# Patient Record
Sex: MALE | URBAN - METROPOLITAN AREA
[De-identification: names, ages, dates, MRNs, and addresses within clinical notes are randomized per-mention and may not be internally consistent; named-entity substitution may affect disease eponyms.]

---

## 2023-06-09 PROBLEM — M19.072 ARTHRITIS OF FIRST METATARSOPHALANGEAL (MTP) JOINT OF LEFT FOOT: Status: ACTIVE | Noted: 2023-06-09

## 2023-09-28 ENCOUNTER — HOSPITAL ENCOUNTER (OUTPATIENT)
Facility: MEDICAL CENTER | Age: 63
End: 2023-09-28
Payer: MEDICARE

## 2023-09-28 PROBLEM — M48.02 CERVICAL STENOSIS OF SPINE: Status: ACTIVE | Noted: 2023-09-28

## 2024-10-30 ENCOUNTER — PRE-ADMISSION TESTING (OUTPATIENT)
Dept: ADMISSIONS | Facility: MEDICAL CENTER | Age: 64
End: 2024-10-30
Payer: MEDICARE

## 2024-10-30 RX ORDER — LISINOPRIL 10 MG/1
10 TABLET ORAL EVERY MORNING
COMMUNITY

## 2024-10-30 RX ORDER — ACETAMINOPHEN 325 MG/1
325 TABLET ORAL EVERY 6 HOURS PRN
COMMUNITY

## 2024-10-30 NOTE — OR NURSING
Pre admit appointment completed with Pipo and wife Kate for surgery/procedure on 10/31/24.    Medication and fasting instructions given per RN pre procedure protocol. Encouraged patient to increase oral intake the day prior to procedure including intake of electrolyte drinks such as Gatorade or electrolyte water. Patient instructed to not start any vitamins and herbal supplements from now until after surgery. Patient instructed to hold any NSAIDS from now until after surgery, unless otherwise instructed by medical provider.     Patient verbalizes understanding of all instructions given. No further questions at this time. Medication instruction sheet emailed to patient 10/30/24.

## 2024-10-31 ENCOUNTER — HOSPITAL ENCOUNTER (OUTPATIENT)
Facility: MEDICAL CENTER | Age: 64
End: 2024-10-31
Attending: OTOLARYNGOLOGY | Admitting: OTOLARYNGOLOGY
Payer: MEDICARE

## 2024-10-31 ENCOUNTER — ANESTHESIA (OUTPATIENT)
Dept: SURGERY | Facility: MEDICAL CENTER | Age: 64
End: 2024-10-31
Payer: MEDICARE

## 2024-10-31 ENCOUNTER — ANESTHESIA EVENT (OUTPATIENT)
Dept: SURGERY | Facility: MEDICAL CENTER | Age: 64
End: 2024-10-31
Payer: MEDICARE

## 2024-10-31 VITALS
OXYGEN SATURATION: 93 % | HEART RATE: 77 BPM | BODY MASS INDEX: 24.81 KG/M2 | RESPIRATION RATE: 27 BRPM | HEIGHT: 70 IN | SYSTOLIC BLOOD PRESSURE: 111 MMHG | DIASTOLIC BLOOD PRESSURE: 64 MMHG | TEMPERATURE: 97.4 F | WEIGHT: 173.28 LBS

## 2024-10-31 LAB
ANION GAP SERPL CALC-SCNC: 10 MMOL/L (ref 7–16)
BUN SERPL-MCNC: 17 MG/DL (ref 8–22)
CALCIUM SERPL-MCNC: 9.5 MG/DL (ref 8.5–10.5)
CHLORIDE SERPL-SCNC: 105 MMOL/L (ref 96–112)
CO2 SERPL-SCNC: 24 MMOL/L (ref 20–33)
CREAT SERPL-MCNC: 1.1 MG/DL (ref 0.5–1.4)
EKG IMPRESSION: NORMAL
GFR SERPLBLD CREATININE-BSD FMLA CKD-EPI: 74 ML/MIN/1.73 M 2
GLUCOSE SERPL-MCNC: 80 MG/DL (ref 65–99)
POTASSIUM SERPL-SCNC: 4.2 MMOL/L (ref 3.6–5.5)
SODIUM SERPL-SCNC: 139 MMOL/L (ref 135–145)

## 2024-10-31 PROCEDURE — 36415 COLL VENOUS BLD VENIPUNCTURE: CPT

## 2024-10-31 PROCEDURE — 93010 ELECTROCARDIOGRAM REPORT: CPT | Performed by: INTERNAL MEDICINE

## 2024-10-31 PROCEDURE — 700102 HCHG RX REV CODE 250 W/ 637 OVERRIDE(OP): Performed by: ANESTHESIOLOGY

## 2024-10-31 PROCEDURE — 700111 HCHG RX REV CODE 636 W/ 250 OVERRIDE (IP): Performed by: ANESTHESIOLOGY

## 2024-10-31 PROCEDURE — 93005 ELECTROCARDIOGRAM TRACING: CPT | Performed by: OTOLARYNGOLOGY

## 2024-10-31 PROCEDURE — 700101 HCHG RX REV CODE 250: Performed by: ANESTHESIOLOGY

## 2024-10-31 PROCEDURE — 160039 HCHG SURGERY MINUTES - EA ADDL 1 MIN LEVEL 3: Performed by: OTOLARYNGOLOGY

## 2024-10-31 PROCEDURE — 160035 HCHG PACU - 1ST 60 MINS PHASE I: Performed by: OTOLARYNGOLOGY

## 2024-10-31 PROCEDURE — 160046 HCHG PACU - 1ST 60 MINS PHASE II: Performed by: OTOLARYNGOLOGY

## 2024-10-31 PROCEDURE — A9270 NON-COVERED ITEM OR SERVICE: HCPCS | Performed by: ANESTHESIOLOGY

## 2024-10-31 PROCEDURE — 160028 HCHG SURGERY MINUTES - 1ST 30 MINS LEVEL 3: Performed by: OTOLARYNGOLOGY

## 2024-10-31 PROCEDURE — 160009 HCHG ANES TIME/MIN: Performed by: OTOLARYNGOLOGY

## 2024-10-31 PROCEDURE — 80048 BASIC METABOLIC PNL TOTAL CA: CPT

## 2024-10-31 PROCEDURE — 160002 HCHG RECOVERY MINUTES (STAT): Performed by: OTOLARYNGOLOGY

## 2024-10-31 PROCEDURE — 160025 RECOVERY II MINUTES (STATS): Performed by: OTOLARYNGOLOGY

## 2024-10-31 PROCEDURE — 160048 HCHG OR STATISTICAL LEVEL 1-5: Performed by: OTOLARYNGOLOGY

## 2024-10-31 RX ORDER — OXYCODONE HCL 5 MG/5 ML
5 SOLUTION, ORAL ORAL
Status: DISCONTINUED | OUTPATIENT
Start: 2024-10-31 | End: 2024-10-31 | Stop reason: HOSPADM

## 2024-10-31 RX ORDER — OXYCODONE HCL 5 MG/5 ML
10 SOLUTION, ORAL ORAL
Status: DISCONTINUED | OUTPATIENT
Start: 2024-10-31 | End: 2024-10-31 | Stop reason: HOSPADM

## 2024-10-31 RX ORDER — HYDRALAZINE HYDROCHLORIDE 20 MG/ML
5 INJECTION INTRAMUSCULAR; INTRAVENOUS
Status: DISCONTINUED | OUTPATIENT
Start: 2024-10-31 | End: 2024-10-31 | Stop reason: HOSPADM

## 2024-10-31 RX ORDER — ACETAMINOPHEN 500 MG
1000 TABLET ORAL ONCE
Status: COMPLETED | OUTPATIENT
Start: 2024-10-31 | End: 2024-10-31

## 2024-10-31 RX ORDER — LIDOCAINE HYDROCHLORIDE 20 MG/ML
INJECTION, SOLUTION EPIDURAL; INFILTRATION; INTRACAUDAL; PERINEURAL PRN
Status: DISCONTINUED | OUTPATIENT
Start: 2024-10-31 | End: 2024-10-31 | Stop reason: SURG

## 2024-10-31 RX ORDER — CELECOXIB 200 MG/1
200 CAPSULE ORAL ONCE
Status: COMPLETED | OUTPATIENT
Start: 2024-10-31 | End: 2024-10-31

## 2024-10-31 RX ORDER — HALOPERIDOL 5 MG/ML
1 INJECTION INTRAMUSCULAR
Status: DISCONTINUED | OUTPATIENT
Start: 2024-10-31 | End: 2024-10-31 | Stop reason: HOSPADM

## 2024-10-31 RX ORDER — DEXAMETHASONE SODIUM PHOSPHATE 4 MG/ML
INJECTION, SOLUTION INTRA-ARTICULAR; INTRALESIONAL; INTRAMUSCULAR; INTRAVENOUS; SOFT TISSUE PRN
Status: DISCONTINUED | OUTPATIENT
Start: 2024-10-31 | End: 2024-10-31 | Stop reason: SURG

## 2024-10-31 RX ORDER — ONDANSETRON 2 MG/ML
INJECTION INTRAMUSCULAR; INTRAVENOUS PRN
Status: DISCONTINUED | OUTPATIENT
Start: 2024-10-31 | End: 2024-10-31 | Stop reason: SURG

## 2024-10-31 RX ORDER — LABETALOL HYDROCHLORIDE 5 MG/ML
5 INJECTION, SOLUTION INTRAVENOUS
Status: DISCONTINUED | OUTPATIENT
Start: 2024-10-31 | End: 2024-10-31 | Stop reason: HOSPADM

## 2024-10-31 RX ORDER — ONDANSETRON 2 MG/ML
4 INJECTION INTRAMUSCULAR; INTRAVENOUS
Status: DISCONTINUED | OUTPATIENT
Start: 2024-10-31 | End: 2024-10-31 | Stop reason: HOSPADM

## 2024-10-31 RX ORDER — ROCURONIUM BROMIDE 10 MG/ML
INJECTION, SOLUTION INTRAVENOUS PRN
Status: DISCONTINUED | OUTPATIENT
Start: 2024-10-31 | End: 2024-10-31 | Stop reason: SURG

## 2024-10-31 RX ORDER — PHENYLEPHRINE HCL IN 0.9% NACL 1 MG/10 ML
SYRINGE (ML) INTRAVENOUS PRN
Status: DISCONTINUED | OUTPATIENT
Start: 2024-10-31 | End: 2024-10-31 | Stop reason: SURG

## 2024-10-31 RX ADMIN — CELECOXIB 200 MG: 200 CAPSULE ORAL at 13:04

## 2024-10-31 RX ADMIN — ACETAMINOPHEN 1000 MG: 500 TABLET ORAL at 13:04

## 2024-10-31 RX ADMIN — SUGAMMADEX 200 MG: 100 INJECTION, SOLUTION INTRAVENOUS at 14:17

## 2024-10-31 RX ADMIN — Medication 200 MCG: at 14:03

## 2024-10-31 RX ADMIN — ROCURONIUM BROMIDE 30 MG: 50 INJECTION, SOLUTION INTRAVENOUS at 13:51

## 2024-10-31 RX ADMIN — PROPOFOL 150 MG: 10 INJECTION, EMULSION INTRAVENOUS at 13:51

## 2024-10-31 RX ADMIN — DEXAMETHASONE SODIUM PHOSPHATE 8 MG: 4 INJECTION INTRA-ARTICULAR; INTRALESIONAL; INTRAMUSCULAR; INTRAVENOUS; SOFT TISSUE at 13:56

## 2024-10-31 RX ADMIN — ONDANSETRON 4 MG: 2 INJECTION INTRAMUSCULAR; INTRAVENOUS at 14:16

## 2024-10-31 RX ADMIN — FENTANYL CITRATE 100 MCG: 50 INJECTION, SOLUTION INTRAMUSCULAR; INTRAVENOUS at 13:51

## 2024-10-31 RX ADMIN — LIDOCAINE HYDROCHLORIDE 60 MG: 20 INJECTION, SOLUTION EPIDURAL; INFILTRATION; INTRACAUDAL at 13:51

## 2024-10-31 ASSESSMENT — PAIN DESCRIPTION - PAIN TYPE
TYPE: SURGICAL PAIN

## 2024-10-31 ASSESSMENT — PAIN SCALES - GENERAL: PAIN_LEVEL: 0

## 2024-10-31 NOTE — OR NURSING
1425: Pt arrived from OR to PACU 7  Connected to monitor. Report received from anesthesia & RN. VSS. Oxygen at 4L via mask. Breaths calm, even, and unlabored.  No signs of pain.     1430 02 weaned to room air, dentures in place.     1435 Spouse Kate brought to bedside and updated to status and plan of care.     1445 Discharge instructions reviewed with patient and family member. All questions answered, verbalizes understanding.     1500: Pt assisted into clothing by spouse    1506: IV and ID bands removed. Pt then escorted to car via wheelchair, accompanied by CCT Demetrice. All personal belongings & discharge instructions with patient/family.

## 2024-10-31 NOTE — DISCHARGE INSTRUCTIONS
If any questions arise, call your provider.  If your provider is not available, please feel free to call the Surgical Center at [phone].    MEDICATIONS: Resume taking daily medication.  Take prescribed pain medication with food.  If no medication is prescribed, you may take non-aspirin pain medication if needed.  PAIN MEDICATION CAN BE VERY CONSTIPATING.  Take a stool softener or laxative such as senokot, pericolace, or milk of magnesia if needed.    Last pain medication given at     1:04 pm: Tylenol and Celebrex (anti-inflammatory like Ibuprofen).     Ok to take more Tylenol and/or Ibuprofen at 7:04 pm if needed.

## 2024-10-31 NOTE — OP REPORT
DATE OF SERVICE: 10/31/24    SURGEON: Bryan Ames MD    ANESTHESIOLOGIST:  Amauri Beckwith MD     PREOPERATIVE DIAGNOSIS:   Left laryngeal neoplasm     POSTOPERATIVE DIAGNOSIS: Left laryngeal neoplasm     PROCEDURE PERFORMED:  Direct laryngoscopy and esophagoscopy     INDICATIONS FOR PROCEDURE: Mr. Gould is a 64-year-old male who had a CT at an outside facility showing a mass in the left piriform sinus/larynx.  Those images were not available for review.  Risks, benefits and   alternatives to the aforementioned procedure were discussed with the patient   preoperatively.     OPERATIVE FINDINGS: No tumor identified in the larynx or upper esophagus     PROCEDURE IN DETAIL:  The patient was identified in the preoperative holding   area and brought to the operating room.  The patient was intubated with a glide scope.  They were turned 90 degrees and a timeout was satisfactorily completed..  A Dedo laryngoscope was inserted into the hypopharynx and   photodocumentation was taken.  No visible tumors were present in the vallecula, larynx, piriform sinus or proximal esophagus. The   patient was turned back to the anesthesiologist before being awoken and   brought to the postanesthesia care unit in good condition.     ESTIMATED BLOOD LOSS:  1 mL     SPECIMENS REMOVED: None     COMPLICATIONS:  None.     IMPLANTS:  None.

## 2024-11-05 ENCOUNTER — HOSPITAL ENCOUNTER (OUTPATIENT)
Dept: RADIOLOGY | Facility: MEDICAL CENTER | Age: 64
End: 2024-11-05
Payer: MEDICARE

## 2024-11-08 ENCOUNTER — HOSPITAL ENCOUNTER (OUTPATIENT)
Dept: RADIOLOGY | Facility: MEDICAL CENTER | Age: 64
End: 2024-11-08
Payer: MEDICARE

## 2024-11-22 ENCOUNTER — HOSPITAL ENCOUNTER (OUTPATIENT)
Dept: RADIOLOGY | Facility: MEDICAL CENTER | Age: 64
End: 2024-11-22
Attending: OTOLARYNGOLOGY
Payer: MEDICARE

## 2024-11-22 DIAGNOSIS — D38.0 NEOPLASM OF UNCERTAIN BEHAVIOR OF LARYNX: ICD-10-CM

## 2024-11-22 PROCEDURE — 10005 FNA BX W/US GDN 1ST LES: CPT

## 2024-11-22 PROCEDURE — 88173 CYTOPATH EVAL FNA REPORT: CPT

## 2024-11-22 PROCEDURE — 88305 TISSUE EXAM BY PATHOLOGIST: CPT

## 2024-11-25 LAB — CYTOLOGY REG CYTOL: NORMAL

## 2024-12-10 ENCOUNTER — HOSPITAL ENCOUNTER (OUTPATIENT)
Dept: RADIOLOGY | Facility: MEDICAL CENTER | Age: 64
End: 2024-12-10
Attending: OTOLARYNGOLOGY
Payer: MEDICARE

## 2024-12-10 DIAGNOSIS — D38.0 NEOPLASM OF UNCERTAIN BEHAVIOR OF LARYNX: ICD-10-CM

## 2024-12-10 LAB
PATHOLOGY CONSULT NOTE: NORMAL
PATHOLOGY CONSULT NOTE: NORMAL

## 2024-12-10 PROCEDURE — 76942 ECHO GUIDE FOR BIOPSY: CPT

## 2024-12-10 PROCEDURE — 88368 INSITU HYBRIDIZATION MANUAL: CPT

## 2024-12-10 PROCEDURE — 88369 M/PHMTRC ALYSISHQUANT/SEMIQ: CPT

## 2024-12-10 PROCEDURE — 88365 INSITU HYBRIDIZATION (FISH): CPT

## 2024-12-10 PROCEDURE — 88341 IMHCHEM/IMCYTCHM EA ADD ANTB: CPT | Mod: 91

## 2024-12-10 PROCEDURE — 88305 TISSUE EXAM BY PATHOLOGIST: CPT

## 2024-12-10 PROCEDURE — 20206 BIOPSY MUSCLE PERQ NEEDLE: CPT

## 2024-12-10 PROCEDURE — 88342 IMHCHEM/IMCYTCHM 1ST ANTB: CPT

## 2024-12-10 NOTE — PROGRESS NOTES
US guided left neck mass core biopsy done by Dr. Yin; NON-SEDATION (no H&P required as this is a NON SEDATION procedure) left anterior aspect of neck access site, dressing CDI; 3 cores in formalin obtained and sent to lab. Pt tolerated the procedure well. Pt hemodynamically stable pre/intra/post procedure; all questions and concerns answered prior to being d/c; patient provided with appropriate education for procedure; pt d/c home.

## 2024-12-18 ENCOUNTER — PRE-ADMISSION TESTING (OUTPATIENT)
Dept: ADMISSIONS | Facility: MEDICAL CENTER | Age: 64
End: 2024-12-18
Payer: MEDICARE

## 2024-12-18 RX ORDER — ACETAMINOPHEN 500 MG
500-1000 TABLET ORAL PRN
COMMUNITY
End: 2024-12-18

## 2024-12-18 NOTE — PREADMIT AVS NOTE
Current Medications   Medication Instructions    IBUPROFEN PO HOLD 5 DAYS PRIOR TO SURGERY, UNLESS INSTRUCTED BY SURGEON.    lisinopril (PRINIVIL) 10 MG Tab HOLD 24 HOURS PRIOR TO SURGERY.     acetaminophen (TYLENOL) 325 MG Tab As needed medication, may take if needed, including morning of procedure

## 2024-12-19 ENCOUNTER — HOSPITAL ENCOUNTER (OUTPATIENT)
Facility: MEDICAL CENTER | Age: 64
End: 2024-12-19
Attending: OTOLARYNGOLOGY | Admitting: OTOLARYNGOLOGY
Payer: MEDICARE

## 2024-12-19 ENCOUNTER — ANESTHESIA (OUTPATIENT)
Dept: SURGERY | Facility: MEDICAL CENTER | Age: 64
End: 2024-12-19
Payer: MEDICARE

## 2024-12-19 ENCOUNTER — ANESTHESIA EVENT (OUTPATIENT)
Dept: SURGERY | Facility: MEDICAL CENTER | Age: 64
End: 2024-12-19
Payer: MEDICARE

## 2024-12-19 VITALS
WEIGHT: 172.18 LBS | SYSTOLIC BLOOD PRESSURE: 109 MMHG | RESPIRATION RATE: 29 BRPM | HEART RATE: 90 BPM | OXYGEN SATURATION: 92 % | HEIGHT: 70 IN | DIASTOLIC BLOOD PRESSURE: 67 MMHG | TEMPERATURE: 97.1 F | BODY MASS INDEX: 24.65 KG/M2

## 2024-12-19 LAB — PATHOLOGY CONSULT NOTE: NORMAL

## 2024-12-19 PROCEDURE — 160041 HCHG SURGERY MINUTES - EA ADDL 1 MIN LEVEL 4: Performed by: OTOLARYNGOLOGY

## 2024-12-19 PROCEDURE — 700102 HCHG RX REV CODE 250 W/ 637 OVERRIDE(OP): Performed by: ANESTHESIOLOGY

## 2024-12-19 PROCEDURE — 160002 HCHG RECOVERY MINUTES (STAT): Performed by: OTOLARYNGOLOGY

## 2024-12-19 PROCEDURE — 700111 HCHG RX REV CODE 636 W/ 250 OVERRIDE (IP): Mod: JZ | Performed by: ANESTHESIOLOGY

## 2024-12-19 PROCEDURE — 160009 HCHG ANES TIME/MIN: Performed by: OTOLARYNGOLOGY

## 2024-12-19 PROCEDURE — 160025 RECOVERY II MINUTES (STATS): Performed by: OTOLARYNGOLOGY

## 2024-12-19 PROCEDURE — 700111 HCHG RX REV CODE 636 W/ 250 OVERRIDE (IP): Performed by: ANESTHESIOLOGY

## 2024-12-19 PROCEDURE — 160035 HCHG PACU - 1ST 60 MINS PHASE I: Performed by: OTOLARYNGOLOGY

## 2024-12-19 PROCEDURE — 700101 HCHG RX REV CODE 250: Performed by: ANESTHESIOLOGY

## 2024-12-19 PROCEDURE — A9270 NON-COVERED ITEM OR SERVICE: HCPCS | Performed by: ANESTHESIOLOGY

## 2024-12-19 PROCEDURE — 700102 HCHG RX REV CODE 250 W/ 637 OVERRIDE(OP): Performed by: OTOLARYNGOLOGY

## 2024-12-19 PROCEDURE — 700101 HCHG RX REV CODE 250: Performed by: OTOLARYNGOLOGY

## 2024-12-19 PROCEDURE — 160048 HCHG OR STATISTICAL LEVEL 1-5: Performed by: OTOLARYNGOLOGY

## 2024-12-19 PROCEDURE — 160029 HCHG SURGERY MINUTES - 1ST 30 MINS LEVEL 4: Performed by: OTOLARYNGOLOGY

## 2024-12-19 PROCEDURE — 160046 HCHG PACU - 1ST 60 MINS PHASE II: Performed by: OTOLARYNGOLOGY

## 2024-12-19 PROCEDURE — 700105 HCHG RX REV CODE 258: Performed by: ANESTHESIOLOGY

## 2024-12-19 PROCEDURE — A9270 NON-COVERED ITEM OR SERVICE: HCPCS | Performed by: OTOLARYNGOLOGY

## 2024-12-19 RX ORDER — SODIUM CHLORIDE 9 MG/ML
INJECTION, SOLUTION INTRAVENOUS
Status: DISCONTINUED | OUTPATIENT
Start: 2024-12-19 | End: 2024-12-19 | Stop reason: SURG

## 2024-12-19 RX ORDER — ACETAMINOPHEN 500 MG
1000 TABLET ORAL EVERY 6 HOURS PRN
Status: DISCONTINUED | OUTPATIENT
Start: 2024-12-19 | End: 2024-12-19 | Stop reason: HOSPADM

## 2024-12-19 RX ORDER — HYDROMORPHONE HYDROCHLORIDE 1 MG/ML
0.4 INJECTION, SOLUTION INTRAMUSCULAR; INTRAVENOUS; SUBCUTANEOUS
Status: DISCONTINUED | OUTPATIENT
Start: 2024-12-19 | End: 2024-12-19 | Stop reason: HOSPADM

## 2024-12-19 RX ORDER — BACITRACIN ZINC 500 [USP'U]/G
OINTMENT TOPICAL
Status: DISCONTINUED | OUTPATIENT
Start: 2024-12-19 | End: 2024-12-19 | Stop reason: HOSPADM

## 2024-12-19 RX ORDER — DEXAMETHASONE SODIUM PHOSPHATE 4 MG/ML
INJECTION, SOLUTION INTRA-ARTICULAR; INTRALESIONAL; INTRAMUSCULAR; INTRAVENOUS; SOFT TISSUE PRN
Status: DISCONTINUED | OUTPATIENT
Start: 2024-12-19 | End: 2024-12-19 | Stop reason: SURG

## 2024-12-19 RX ORDER — ONDANSETRON 2 MG/ML
INJECTION INTRAMUSCULAR; INTRAVENOUS PRN
Status: DISCONTINUED | OUTPATIENT
Start: 2024-12-19 | End: 2024-12-19 | Stop reason: SURG

## 2024-12-19 RX ORDER — BUPIVACAINE HYDROCHLORIDE AND EPINEPHRINE 5; 5 MG/ML; UG/ML
INJECTION, SOLUTION PERINEURAL
Status: DISCONTINUED | OUTPATIENT
Start: 2024-12-19 | End: 2024-12-19 | Stop reason: HOSPADM

## 2024-12-19 RX ORDER — ONDANSETRON 2 MG/ML
4 INJECTION INTRAMUSCULAR; INTRAVENOUS
Status: DISCONTINUED | OUTPATIENT
Start: 2024-12-19 | End: 2024-12-19 | Stop reason: HOSPADM

## 2024-12-19 RX ORDER — PHENYLEPHRINE HCL IN 0.9% NACL 1 MG/10 ML
SYRINGE (ML) INTRAVENOUS PRN
Status: DISCONTINUED | OUTPATIENT
Start: 2024-12-19 | End: 2024-12-19 | Stop reason: SURG

## 2024-12-19 RX ORDER — DIPHENHYDRAMINE HYDROCHLORIDE 50 MG/ML
6.25 INJECTION INTRAMUSCULAR; INTRAVENOUS
Status: DISCONTINUED | OUTPATIENT
Start: 2024-12-19 | End: 2024-12-19 | Stop reason: HOSPADM

## 2024-12-19 RX ORDER — EPHEDRINE SULFATE 50 MG/ML
10 INJECTION, SOLUTION INTRAVENOUS
Status: DISCONTINUED | OUTPATIENT
Start: 2024-12-19 | End: 2024-12-19 | Stop reason: HOSPADM

## 2024-12-19 RX ORDER — LIDOCAINE HYDROCHLORIDE 20 MG/ML
INJECTION, SOLUTION EPIDURAL; INFILTRATION; INTRACAUDAL; PERINEURAL PRN
Status: DISCONTINUED | OUTPATIENT
Start: 2024-12-19 | End: 2024-12-19 | Stop reason: SURG

## 2024-12-19 RX ORDER — MEPERIDINE HYDROCHLORIDE 25 MG/ML
12.5 INJECTION INTRAMUSCULAR; INTRAVENOUS; SUBCUTANEOUS
Status: DISCONTINUED | OUTPATIENT
Start: 2024-12-19 | End: 2024-12-19 | Stop reason: HOSPADM

## 2024-12-19 RX ORDER — HYDROMORPHONE HYDROCHLORIDE 1 MG/ML
0.2 INJECTION, SOLUTION INTRAMUSCULAR; INTRAVENOUS; SUBCUTANEOUS
Status: DISCONTINUED | OUTPATIENT
Start: 2024-12-19 | End: 2024-12-19 | Stop reason: HOSPADM

## 2024-12-19 RX ORDER — OXYCODONE HCL 5 MG/5 ML
5 SOLUTION, ORAL ORAL
Status: COMPLETED | OUTPATIENT
Start: 2024-12-19 | End: 2024-12-19

## 2024-12-19 RX ORDER — BACITRACIN ZINC 500 [USP'U]/G
OINTMENT TOPICAL
Status: DISCONTINUED
Start: 2024-12-19 | End: 2024-12-19 | Stop reason: HOSPADM

## 2024-12-19 RX ORDER — HYDRALAZINE HYDROCHLORIDE 20 MG/ML
5 INJECTION INTRAMUSCULAR; INTRAVENOUS
Status: DISCONTINUED | OUTPATIENT
Start: 2024-12-19 | End: 2024-12-19 | Stop reason: HOSPADM

## 2024-12-19 RX ORDER — LIDOCAINE HYDROCHLORIDE AND EPINEPHRINE 10; 10 MG/ML; UG/ML
INJECTION, SOLUTION INFILTRATION; PERINEURAL
Status: DISCONTINUED
Start: 2024-12-19 | End: 2024-12-19 | Stop reason: HOSPADM

## 2024-12-19 RX ORDER — EPHEDRINE SULFATE 50 MG/ML
INJECTION, SOLUTION INTRAVENOUS PRN
Status: DISCONTINUED | OUTPATIENT
Start: 2024-12-19 | End: 2024-12-19 | Stop reason: SURG

## 2024-12-19 RX ORDER — CEFAZOLIN SODIUM 1 G/3ML
INJECTION, POWDER, FOR SOLUTION INTRAMUSCULAR; INTRAVENOUS PRN
Status: DISCONTINUED | OUTPATIENT
Start: 2024-12-19 | End: 2024-12-19 | Stop reason: SURG

## 2024-12-19 RX ORDER — ALBUTEROL SULFATE 5 MG/ML
2.5 SOLUTION RESPIRATORY (INHALATION)
Status: DISCONTINUED | OUTPATIENT
Start: 2024-12-19 | End: 2024-12-19 | Stop reason: HOSPADM

## 2024-12-19 RX ORDER — HALOPERIDOL 5 MG/ML
1 INJECTION INTRAMUSCULAR
Status: DISCONTINUED | OUTPATIENT
Start: 2024-12-19 | End: 2024-12-19 | Stop reason: HOSPADM

## 2024-12-19 RX ORDER — SODIUM CHLORIDE, SODIUM LACTATE, POTASSIUM CHLORIDE, CALCIUM CHLORIDE 600; 310; 30; 20 MG/100ML; MG/100ML; MG/100ML; MG/100ML
INJECTION, SOLUTION INTRAVENOUS CONTINUOUS
Status: DISCONTINUED | OUTPATIENT
Start: 2024-12-19 | End: 2024-12-19 | Stop reason: HOSPADM

## 2024-12-19 RX ORDER — OXYCODONE HCL 5 MG/5 ML
10 SOLUTION, ORAL ORAL
Status: COMPLETED | OUTPATIENT
Start: 2024-12-19 | End: 2024-12-19

## 2024-12-19 RX ORDER — HYDROMORPHONE HYDROCHLORIDE 1 MG/ML
0.1 INJECTION, SOLUTION INTRAMUSCULAR; INTRAVENOUS; SUBCUTANEOUS
Status: DISCONTINUED | OUTPATIENT
Start: 2024-12-19 | End: 2024-12-19 | Stop reason: HOSPADM

## 2024-12-19 RX ADMIN — ONDANSETRON 4 MG: 2 INJECTION INTRAMUSCULAR; INTRAVENOUS at 13:39

## 2024-12-19 RX ADMIN — FENTANYL CITRATE 50 MCG: 50 INJECTION, SOLUTION INTRAMUSCULAR; INTRAVENOUS at 13:19

## 2024-12-19 RX ADMIN — Medication 100 MCG: at 13:31

## 2024-12-19 RX ADMIN — PROPOFOL 140 MG: 10 INJECTION, EMULSION INTRAVENOUS at 13:19

## 2024-12-19 RX ADMIN — DEXAMETHASONE SODIUM PHOSPHATE 8 MG: 4 INJECTION INTRA-ARTICULAR; INTRALESIONAL; INTRAMUSCULAR; INTRAVENOUS; SOFT TISSUE at 13:22

## 2024-12-19 RX ADMIN — EPHEDRINE SULFATE 10 MG: 50 INJECTION, SOLUTION INTRAVENOUS at 13:29

## 2024-12-19 RX ADMIN — FENTANYL CITRATE 50 MCG: 50 INJECTION, SOLUTION INTRAMUSCULAR; INTRAVENOUS at 14:06

## 2024-12-19 RX ADMIN — CEFAZOLIN 2 G: 1 INJECTION, POWDER, FOR SOLUTION INTRAMUSCULAR; INTRAVENOUS at 13:19

## 2024-12-19 RX ADMIN — Medication 100 MCG: at 13:45

## 2024-12-19 RX ADMIN — EPHEDRINE SULFATE 10 MG: 50 INJECTION, SOLUTION INTRAVENOUS at 13:27

## 2024-12-19 RX ADMIN — OXYCODONE HYDROCHLORIDE 10 MG: 5 SOLUTION ORAL at 14:06

## 2024-12-19 RX ADMIN — LIDOCAINE HYDROCHLORIDE 50 MG: 20 INJECTION, SOLUTION EPIDURAL; INFILTRATION; INTRACAUDAL; PERINEURAL at 13:19

## 2024-12-19 RX ADMIN — SODIUM CHLORIDE: 9 INJECTION, SOLUTION INTRAVENOUS at 13:16

## 2024-12-19 ASSESSMENT — PAIN DESCRIPTION - PAIN TYPE
TYPE: SURGICAL PAIN

## 2024-12-19 NOTE — DISCHARGE INSTRUCTIONS
What to Expect Post Anesthesia    Rest and take it easy for the first 24 hours.  A responsible adult is recommended to remain with you during that time.  It is normal to feel sleepy.  We encourage you to not do anything that requires balance, judgment or coordination.    FOR 24 HOURS DO NOT:  Drive, operate machinery or run household appliances.  Drink beer or alcoholic beverages.  Make important decisions or sign legal documents.    To avoid nausea, slowly advance diet as tolerated, avoiding spicy or greasy foods for the first day.  Add more substantial food to your diet according to your provider's instructions.  Babies can be fed formula or breast milk as soon as they are hungry.  INCREASE FLUIDS AND FIBER TO AVOID CONSTIPATION.    MILD FLU-LIKE SYMPTOMS ARE NORMAL.  YOU MAY EXPERIENCE GENERALIZED MUSCLE ACHES, THROAT IRRITATION, HEADACHE AND/OR SOME NAUSEA.    If any questions arise, call your provider.  If your provider is not available, please feel free to call the Surgical Center at [phone].    MEDICATIONS: Resume taking daily medication.  Take prescribed pain medication with food.  If no medication is prescribed, you may take non-aspirin pain medication if needed.  PAIN MEDICATION CAN BE VERY CONSTIPATING.  Take a stool softener or laxative such as senokot, pericolace, or milk of magnesia if needed.    Oxycodone given at 2:06 PM.   You may take tylenol and ibuprofen whenever you may need.

## 2024-12-19 NOTE — OR NURSING
1353 Patient arrived to PACU from OR. Report received from RN and anesthesia. Patient attached to monitoring. VSS. Patient oxygenating well on 6 L via mask. Site assessed on L neck, CDI. No signs of bleeding.     1406 Patient feeling pain in neck. IV and PO medication given per MAR.    1415 Patient wife brought to bedside     1430 Patient meets phase two criteria. Tolerating PO liquids without complication.     1440 RN went over discharge instructions with patient and patient's wife. All questions answered.     1445 Kwaku NORRIS at bedside to discuss procedure with patient.     1453 Intact IV removed by RN. Patient meets discharge criteria. VSS. Pt discharged to a responsible adult via wheelchair by RN. Pt in possession of all personal belongings.

## 2024-12-19 NOTE — OP REPORT
DATE OF SERVICE: 12/19/24     SURGEON:  Bryan Ames MD.     ASSISTANT:  None     ANESTHESIOLOGIST:  Amauri Rivero MD     PREOPERATIVE DIAGNOSIS:   1.  Left neck mass  2.  Presumed lymphoma     POSTOPERATIVE DIAGNOSIS:    1.  Left neck mass  2.  Presumed lymphoma     PROCEDURE: Incisional biopsy of deep left neck mass <5cm     INDICATIONS FOR PROCEDURE: 64-year-old male with thyroid cartilage-destroying left neck mass.  He has had a core biopsy that was nondiagnostic so is here for an incisional biopsy..  Risks, benefits, and alternativesto the procedure were discussed with the patient preoperatively and the patient gave their informed written consent.     OPERATIVE FINDINGS: Approximately 1 cm³ of tissue taken for flow cytometry     DESCRIPTION OF PROCEDURE:  The patient was identified in the preoperative holding area and brought to the operating room.  A laryngeal mask airway was placed and a timeout was satisfactorily completed.  A neck crease was marked and injected with 1% lidocaine with 1-100,000 epinephrine.  3 cc of this were used.  The patient was prepped and draped in usual sterile fashion.  A skin incision was made with a 15 blade scalpel and this was carried down through the platysma.  The strap muscles were  bluntly.  The mass was encountered and incised with the Bovie taking out several pieces.  The wound cavity was hemostatic so the platysma was reapproximated using simple interrupted 3-0 Vicryl sutures.  4-0 Vicryl sutures were placed in the deep dermis.  Bacitracin, Telfa and Tegaderm dressing was applied.  The patient was awoken and brought to the postanesthesia care unit in good condition.     ESTIMATED BLOOD LOSS:  <5 mL.     URINE OUTPUT:  Not recorded.     COMPLICATIONS:  None.     SPECIMENS REMOVED: Left neck mass, sent in saline, lymphoma protocol

## 2024-12-19 NOTE — ANESTHESIA PROCEDURE NOTES
Airway    Date/Time: 12/19/2024 1:19 PM    Performed by: Amauri Rivero M.D.  Authorized by: Amauri Rivero M.D.    Location:  OR  Urgency:  Elective  Difficult Airway: No    Indications for Airway Management:  Anesthesia      Spontaneous Ventilation: absent    Sedation Level:  Deep  Preoxygenated: Yes    Mask Difficulty Assessment:  0 - not attempted  Final Airway Type:  Supraglottic airway  Final Supraglottic Airway:  Standard LMA    SGA Size:  4  Number of Attempts at Approach:  1

## 2024-12-19 NOTE — ANESTHESIA POSTPROCEDURE EVALUATION
Patient: Pipo Gould    Procedure Summary       Date: 12/19/24 Room / Location: [facility] ROOM 23 / SURGERY SAME DAY [facility]    Anesthesia Start: 1316 Anesthesia Stop: 1355    Procedure: BIOPSY OR EXCISION OF LYMPH NODES; OPEN DEEP CERVICAL NODES, LEFT (Left: Neck) Diagnosis: (NEOPLASM OF UNCERTAIN BEHAVIOR)    Surgeons: Bryan Ames M.D. Responsible Provider: Amauri Rivero M.D.    Anesthesia Type: general ASA Status: 2            Final Anesthesia Type: general  Last vitals  BP   Blood Pressure : 102/66    Temp   36.2 °C (97.1 °F)    Pulse   87   Resp   19    SpO2   93 %      Anesthesia Post Evaluation    Patient location during evaluation: PACU  Patient participation: complete - patient participated  Level of consciousness: awake and alert    Airway patency: patent  Anesthetic complications: no  Cardiovascular status: hemodynamically stable  Respiratory status: acceptable  Hydration status: euvolemic    PONV: none          No notable events documented.     Nurse Pain Score: 6 (NPRS)

## 2024-12-19 NOTE — ANESTHESIA PREPROCEDURE EVALUATION
Case: [id number] Date/Time: 12/19/24 1245    Procedure: BIOPSY OR EXCISION OF LYMPH NODES; OPEN DEEP CERVICAL NODES    Pre-op diagnosis: NEOPLASM OF UNCERTAIN BEHAVIOR    Location: [facility] OR 10 / SURGERY [facility]    Surgeons: Bryan Ames M.D.          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, need more tissue for dx/p laryngoscopy (10/31/24) w/o visible tumor    Relevant Problems   Other   (positive) Arthritis of first metatarsophalangeal (MTP) joint of left foot       Physical Exam    Airway   Mallampati: II  TM distance: >3 FB  Neck ROM: full       Cardiovascular - normal exam  Rhythm: regular  Rate: normal  (-) murmur     Dental - normal exam           Pulmonary - normal exam  Breath sounds clear to auscultation     Abdominal    Neurological - normal exam                   Anesthesia Plan    ASA 2       Plan - general       Airway plan will be LMA          Induction: intravenous    Postoperative Plan: Postoperative administration of opioids is intended.    Pertinent diagnostic labs and testing reviewed    Informed Consent:    Anesthetic plan and risks discussed with patient.

## 2024-12-19 NOTE — ANESTHESIA TIME REPORT
Anesthesia Start and Stop Event Times       Date Time Event    12/19/2024 1313 Ready for Procedure     1316 Anesthesia Start     1355 Anesthesia Stop          Responsible Staff  12/19/24      Name Role Begin End    Amauri Rivero M.D. Anesth 1316 1356          Overtime Reason:  no overtime (within assigned shift)    Comments:

## 2025-01-11 NOTE — PROGRESS NOTES
US guided left neck mass fine needle aspiration done by Dr. Yin; NON-SEDATION (no H&P required as this is a NON SEDATION procedure) left anterior aspect of neck access site, dressing CDI; 4 samples in 1 jar of cytolyt obtained and sent to lab. Pt tolerated the procedure well. Pt hemodynamically stable pre/intra/post procedure; all questions and concerns answered prior to being d/c; patient provided with appropriate education for procedure; pt d/c home.  
5 Woo Foreman RN

## 2025-01-23 ENCOUNTER — HOSPITAL ENCOUNTER (OUTPATIENT)
Dept: RADIATION ONCOLOGY | Facility: MEDICAL CENTER | Age: 65
End: 2025-01-23
Attending: RADIOLOGY
Payer: MEDICARE

## 2025-01-23 ENCOUNTER — APPOINTMENT (OUTPATIENT)
Dept: RADIOLOGY | Facility: MEDICAL CENTER | Age: 65
DRG: 842 | End: 2025-01-23
Payer: MEDICARE

## 2025-01-23 ENCOUNTER — HOSPITAL ENCOUNTER (INPATIENT)
Facility: MEDICAL CENTER | Age: 65
LOS: 2 days | DRG: 842 | End: 2025-01-25
Attending: EMERGENCY MEDICINE | Admitting: HOSPITALIST
Payer: MEDICARE

## 2025-01-23 ENCOUNTER — APPOINTMENT (OUTPATIENT)
Dept: RADIOLOGY | Facility: MEDICAL CENTER | Age: 65
DRG: 842 | End: 2025-01-23
Attending: EMERGENCY MEDICINE
Payer: MEDICARE

## 2025-01-23 DIAGNOSIS — R22.1 NECK MASS: ICD-10-CM

## 2025-01-23 DIAGNOSIS — K59.00 CONSTIPATION, UNSPECIFIED CONSTIPATION TYPE: ICD-10-CM

## 2025-01-23 PROBLEM — C90.30: Status: ACTIVE | Noted: 2025-01-23

## 2025-01-23 LAB
ANION GAP SERPL CALC-SCNC: 13 MMOL/L (ref 7–16)
BASOPHILS # BLD AUTO: 0.2 % (ref 0–1.8)
BASOPHILS # BLD: 0.03 K/UL (ref 0–0.12)
BUN SERPL-MCNC: 16 MG/DL (ref 8–22)
CA-I SERPL-SCNC: 1.2 MMOL/L (ref 1.1–1.3)
CALCIUM SERPL-MCNC: 9.3 MG/DL (ref 8.5–10.5)
CHLORIDE SERPL-SCNC: 104 MMOL/L (ref 96–112)
CO2 SERPL-SCNC: 22 MMOL/L (ref 20–33)
CREAT SERPL-MCNC: 1.24 MG/DL (ref 0.5–1.4)
EKG IMPRESSION: NORMAL
EOSINOPHIL # BLD AUTO: 0.01 K/UL (ref 0–0.51)
EOSINOPHIL NFR BLD: 0.1 % (ref 0–6.9)
ERYTHROCYTE [DISTWIDTH] IN BLOOD BY AUTOMATED COUNT: 45.6 FL (ref 35.9–50)
GFR SERPLBLD CREATININE-BSD FMLA CKD-EPI: 64 ML/MIN/1.73 M 2
GLUCOSE SERPL-MCNC: 92 MG/DL (ref 65–99)
HCT VFR BLD AUTO: 42.5 % (ref 42–52)
HGB BLD-MCNC: 13.8 G/DL (ref 14–18)
IMM GRANULOCYTES # BLD AUTO: 0.06 K/UL (ref 0–0.11)
IMM GRANULOCYTES NFR BLD AUTO: 0.5 % (ref 0–0.9)
LYMPHOCYTES # BLD AUTO: 1.02 K/UL (ref 1–4.8)
LYMPHOCYTES NFR BLD: 8 % (ref 22–41)
MAGNESIUM SERPL-MCNC: 2 MG/DL (ref 1.5–2.5)
MCH RBC QN AUTO: 32.2 PG (ref 27–33)
MCHC RBC AUTO-ENTMCNC: 32.5 G/DL (ref 32.3–36.5)
MCV RBC AUTO: 99.1 FL (ref 81.4–97.8)
MONOCYTES # BLD AUTO: 1.77 K/UL (ref 0–0.85)
MONOCYTES NFR BLD AUTO: 13.9 % (ref 0–13.4)
NEUTROPHILS # BLD AUTO: 9.83 K/UL (ref 1.82–7.42)
NEUTROPHILS NFR BLD: 77.3 % (ref 44–72)
NRBC # BLD AUTO: 0 K/UL
NRBC BLD-RTO: 0 /100 WBC (ref 0–0.2)
PHOSPHATE SERPL-MCNC: 2.2 MG/DL (ref 2.5–4.5)
PLATELET # BLD AUTO: 167 K/UL (ref 164–446)
PMV BLD AUTO: 10.1 FL (ref 9–12.9)
POTASSIUM SERPL-SCNC: 4 MMOL/L (ref 3.6–5.5)
RBC # BLD AUTO: 4.29 M/UL (ref 4.7–6.1)
SODIUM SERPL-SCNC: 139 MMOL/L (ref 135–145)
WBC # BLD AUTO: 12.7 K/UL (ref 4.8–10.8)

## 2025-01-23 PROCEDURE — 70491 CT SOFT TISSUE NECK W/DYE: CPT

## 2025-01-23 PROCEDURE — 700111 HCHG RX REV CODE 636 W/ 250 OVERRIDE (IP): Performed by: HOSPITALIST

## 2025-01-23 PROCEDURE — 36415 COLL VENOUS BLD VENIPUNCTURE: CPT

## 2025-01-23 PROCEDURE — 83735 ASSAY OF MAGNESIUM: CPT

## 2025-01-23 PROCEDURE — 85025 COMPLETE CBC W/AUTO DIFF WBC: CPT

## 2025-01-23 PROCEDURE — 770004 HCHG ROOM/CARE - ONCOLOGY PRIVATE *

## 2025-01-23 PROCEDURE — 80048 BASIC METABOLIC PNL TOTAL CA: CPT

## 2025-01-23 PROCEDURE — 82330 ASSAY OF CALCIUM: CPT

## 2025-01-23 PROCEDURE — 85610 PROTHROMBIN TIME: CPT

## 2025-01-23 PROCEDURE — 93005 ELECTROCARDIOGRAM TRACING: CPT | Mod: TC

## 2025-01-23 PROCEDURE — 96374 THER/PROPH/DIAG INJ IV PUSH: CPT

## 2025-01-23 PROCEDURE — 74150 CT ABDOMEN W/O CONTRAST: CPT

## 2025-01-23 PROCEDURE — 84100 ASSAY OF PHOSPHORUS: CPT

## 2025-01-23 PROCEDURE — 96375 TX/PRO/DX INJ NEW DRUG ADDON: CPT

## 2025-01-23 PROCEDURE — 700117 HCHG RX CONTRAST REV CODE 255: Performed by: EMERGENCY MEDICINE

## 2025-01-23 PROCEDURE — 99223 1ST HOSP IP/OBS HIGH 75: CPT | Mod: AI | Performed by: HOSPITALIST

## 2025-01-23 PROCEDURE — 93005 ELECTROCARDIOGRAM TRACING: CPT | Mod: TC | Performed by: EMERGENCY MEDICINE

## 2025-01-23 PROCEDURE — 700105 HCHG RX REV CODE 258: Performed by: HOSPITALIST

## 2025-01-23 PROCEDURE — 99285 EMERGENCY DEPT VISIT HI MDM: CPT

## 2025-01-23 PROCEDURE — 700111 HCHG RX REV CODE 636 W/ 250 OVERRIDE (IP): Performed by: EMERGENCY MEDICINE

## 2025-01-23 RX ORDER — MORPHINE SULFATE 4 MG/ML
4 INJECTION INTRAVENOUS
Status: DISCONTINUED | OUTPATIENT
Start: 2025-01-23 | End: 2025-01-25 | Stop reason: HOSPADM

## 2025-01-23 RX ORDER — DEXAMETHASONE SODIUM PHOSPHATE 4 MG/ML
4 INJECTION, SOLUTION INTRA-ARTICULAR; INTRALESIONAL; INTRAMUSCULAR; INTRAVENOUS; SOFT TISSUE ONCE
Status: COMPLETED | OUTPATIENT
Start: 2025-01-23 | End: 2025-01-23

## 2025-01-23 RX ORDER — DEXAMETHASONE SODIUM PHOSPHATE 4 MG/ML
4 INJECTION, SOLUTION INTRA-ARTICULAR; INTRALESIONAL; INTRAMUSCULAR; INTRAVENOUS; SOFT TISSUE EVERY 6 HOURS
Status: DISCONTINUED | OUTPATIENT
Start: 2025-01-23 | End: 2025-01-25 | Stop reason: HOSPADM

## 2025-01-23 RX ORDER — MORPHINE SULFATE 4 MG/ML
2 INJECTION INTRAVENOUS ONCE
Status: COMPLETED | OUTPATIENT
Start: 2025-01-23 | End: 2025-01-23

## 2025-01-23 RX ORDER — SODIUM CHLORIDE, SODIUM LACTATE, POTASSIUM CHLORIDE, CALCIUM CHLORIDE 600; 310; 30; 20 MG/100ML; MG/100ML; MG/100ML; MG/100ML
INJECTION, SOLUTION INTRAVENOUS CONTINUOUS
Status: ACTIVE | OUTPATIENT
Start: 2025-01-23 | End: 2025-01-24

## 2025-01-23 RX ORDER — OXYCODONE HYDROCHLORIDE 5 MG/1
5 TABLET ORAL
COMMUNITY
Start: 2025-01-22

## 2025-01-23 RX ADMIN — DEXAMETHASONE SODIUM PHOSPHATE 4 MG: 4 INJECTION INTRA-ARTICULAR; INTRALESIONAL; INTRAMUSCULAR; INTRAVENOUS; SOFT TISSUE at 17:15

## 2025-01-23 RX ADMIN — FAMOTIDINE 20 MG: 10 INJECTION, SOLUTION INTRAVENOUS at 17:15

## 2025-01-23 RX ADMIN — IOHEXOL 80 ML: 350 INJECTION, SOLUTION INTRAVENOUS at 13:45

## 2025-01-23 RX ADMIN — MORPHINE SULFATE 2 MG: 4 INJECTION, SOLUTION INTRAMUSCULAR; INTRAVENOUS at 14:27

## 2025-01-23 RX ADMIN — DEXAMETHASONE SODIUM PHOSPHATE 4 MG: 4 INJECTION INTRA-ARTICULAR; INTRALESIONAL; INTRAMUSCULAR; INTRAVENOUS; SOFT TISSUE at 11:44

## 2025-01-23 RX ADMIN — SODIUM CHLORIDE, POTASSIUM CHLORIDE, SODIUM LACTATE AND CALCIUM CHLORIDE 1000 ML: 600; 310; 30; 20 INJECTION, SOLUTION INTRAVENOUS at 14:28

## 2025-01-23 SDOH — ECONOMIC STABILITY: TRANSPORTATION INSECURITY
IN THE PAST 12 MONTHS, HAS THE LACK OF TRANSPORTATION KEPT YOU FROM MEDICAL APPOINTMENTS OR FROM GETTING MEDICATIONS?: NO

## 2025-01-23 SDOH — ECONOMIC STABILITY: TRANSPORTATION INSECURITY
IN THE PAST 12 MONTHS, HAS LACK OF RELIABLE TRANSPORTATION KEPT YOU FROM MEDICAL APPOINTMENTS, MEETINGS, WORK OR FROM GETTING THINGS NEEDED FOR DAILY LIVING?: NO

## 2025-01-23 ASSESSMENT — SOCIAL DETERMINANTS OF HEALTH (SDOH)
IN THE PAST 12 MONTHS, HAS THE ELECTRIC, GAS, OIL, OR WATER COMPANY THREATENED TO SHUT OFF SERVICE IN YOUR HOME?: NO
WITHIN THE PAST 12 MONTHS, YOU WORRIED THAT YOUR FOOD WOULD RUN OUT BEFORE YOU GOT THE MONEY TO BUY MORE: NEVER TRUE
WITHIN THE LAST YEAR, HAVE TO BEEN RAPED OR FORCED TO HAVE ANY KIND OF SEXUAL ACTIVITY BY YOUR PARTNER OR EX-PARTNER?: NO
WITHIN THE LAST YEAR, HAVE YOU BEEN HUMILIATED OR EMOTIONALLY ABUSED IN OTHER WAYS BY YOUR PARTNER OR EX-PARTNER?: NO
WITHIN THE PAST 12 MONTHS, THE FOOD YOU BOUGHT JUST DIDN'T LAST AND YOU DIDN'T HAVE MONEY TO GET MORE: NEVER TRUE
WITHIN THE LAST YEAR, HAVE YOU BEEN KICKED, HIT, SLAPPED, OR OTHERWISE PHYSICALLY HURT BY YOUR PARTNER OR EX-PARTNER?: NO
WITHIN THE LAST YEAR, HAVE YOU BEEN AFRAID OF YOUR PARTNER OR EX-PARTNER?: NO

## 2025-01-23 ASSESSMENT — PAIN DESCRIPTION - PAIN TYPE
TYPE: ACUTE PAIN

## 2025-01-23 ASSESSMENT — COGNITIVE AND FUNCTIONAL STATUS - GENERAL
SUGGESTED CMS G CODE MODIFIER MOBILITY: CH
MOBILITY SCORE: 24

## 2025-01-23 ASSESSMENT — ENCOUNTER SYMPTOMS
CHILLS: 0
FEVER: 0

## 2025-01-23 ASSESSMENT — PATIENT HEALTH QUESTIONNAIRE - PHQ9
1. LITTLE INTEREST OR PLEASURE IN DOING THINGS: NOT AT ALL
SUM OF ALL RESPONSES TO PHQ9 QUESTIONS 1 AND 2: 0
2. FEELING DOWN, DEPRESSED, IRRITABLE, OR HOPELESS: NOT AT ALL

## 2025-01-23 ASSESSMENT — LIFESTYLE VARIABLES
ALCOHOL_USE: NO
HAVE YOU EVER FELT YOU SHOULD CUT DOWN ON YOUR DRINKING: NO

## 2025-01-23 NOTE — ASSESSMENT & PLAN NOTE
Large neck mass to the point where he is now unable to swallow and will need a gastric tube for meds and feeding.  Given the degree of obstruction this likely will need to be done by interventional radiology as gastroenterology may not build to pass a tube through his esophagus given the mass effect. This has been ordered.  IV steroids for now and monitor his airway with IV pepcid for GI prophylaxis.  IV fluids.  He had a biopsy done by Dr. Ames ENT in December revealing a plasma cell neoplasm.  He is followed by Dr. Longoria oncology as an outpatient and had bone marrow biopsy done yesterday. I have consulted Dr. Odonnell oncology from the ER.

## 2025-01-23 NOTE — ED TRIAGE NOTES
Chief Complaint   Patient presents with    Oral Swelling     Pt reports increased throat swelling, discomfort, and difficulty swallowing x1 day.     Difficulty Swallowing    Ambulatory to triage for above complaint with family. Pt states he has a history of throat cancer and had a bone marrow biopsy completed yesterday on 1/22. Pt reports he has had increased throat swelling and difficulty swallowing since yesterday. PT states he has had intermittent SOB. Pt and family report he is going through early stages of cancer treatment but he has not started radiation/chemo yet. Pt states he had his last tumor biopsy on 12/19. Denies recent fevers. Pt states he has been unable to drink, eat, or take his pills due to increased swelling. Pt coughing and attempting to clear throat in triage. Pt also states he has had increased dizziness today.     Charge RN notified    Pt placed in lobby and educated on triage process. Pt encouraged to alert staff for any changes.    Protocol ordered.     Patient and staff wearing appropriate PPE.

## 2025-01-23 NOTE — CONSULTS
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    4 hypermetabolic lytic bone lesions consistent with myeloma involving the distal right clavicle, inferior right scapula, left lateral 8th rib and left posterolateral 9th rib, measuring up to 5.2 cm in the inferior right scapula.    He was supposed to get palliative radiation however over the past 2 days he had rapid enlargement of the mass and hence came to the ER.  He had bone marrow biopsy done yesterday    Past Medical History:    Past Medical History:   Diagnosis Date    Arthritis     Breath shortness     with exertion    Hypertension     Pneumonia 2012       Past surgical history:    Past Surgical History:   Procedure Laterality Date    KS BX/REMV,LYMPH NODE,DEEP CERV Left 12/19/2024    Procedure: BIOPSY OR EXCISION OF LYMPH NODES; OPEN DEEP CERVICAL NODES, LEFT;  Surgeon: Bryan Ames M.D.;  Location: SURGERY SAME DAY [facility];  Service: Ent    LARYNGOSCOPY, DIRECT, WITH BIOPSY IF INDICATED N/A 10/31/2024    Procedure: MICROSCOPIC DIRECT LARYNGOSCOPY AND ESOPHAGOSCOPY;  Surgeon: Bryan Ames M.D.;  Location: SURGERY SAME DAY [facility];  Service: Ent    PB FUSION BIG TOE,MT-P JT Left 07/06/2023    Procedure: LEFT GREAT METATARSOPHALANGEAL JOINT FUSION;  Surgeon: Qasim Azul M.D.;  Location: [facility];  Service: Orthopedics    PB REMV BONE FOR GRAFT MAJOR Left 2023    Procedure: LEFT CALCANEAL AUTOGRAFT;  Surgeon: Qasim Azul M.D.;  Location: [facility];  Service: Orthopedics    THORACOTOMY  2012       Allergies:  Patient has no known allergies.    Medications:    Current Facility-Administered Medications   Medication Dose Route Frequency Provider Last Rate Last Admin    dexamethasone (Decadron) injection 4 mg  4 mg Intravenous Q6HRS Gavin Rice M.D.        famotidine (Pepcid) injection 20 mg  20 mg Intravenous BID Gavin Rice M.D.        lactated ringers infusion   Intravenous Continuous Gavin Rice M.D. 83 mL/hr at 25 1428 1,000 mL at 25 1428     Current Outpatient Medications   Medication Sig Dispense Refill    IBUPROFEN PO Take  by mouth as needed.      lisinopril (PRINIVIL) 10 MG Tab Take 10 mg by mouth every morning.      acetaminophen (TYLENOL) 325 MG Tab Take 325 mg by mouth every 6 hours as needed.         Social History:     Social History     Socioeconomic History    Marital status:      Spouse name: Not on file    Number of children: Not on file    Years of education: Not on file    Highest education level: Not on file   Occupational History    Not on file   Tobacco Use    Smoking status: Former     Current packs/day: 0.00     Average packs/day: 1 pack/day for 30.0 years (30.0 ttl pk-yrs)     Types: Cigarettes     Start date:      Quit date:      Years since quittin.0    Smokeless tobacco: Never   Vaping Use    Vaping status: Former   Substance and Sexual Activity    Alcohol use: Not Currently    Drug use: Not Currently    Sexual activity: Not on file   Other Topics Concern    Not on file   Social History Narrative    Not on file     Social Drivers of Health     Financial Resource Strain: Not on file   Food Insecurity: Not on file   Transportation Needs: Not on file   Physical Activity: Not on file  "  Stress: Not on file   Social Connections: Not on file   Intimate Partner Violence: Not on file   Housing Stability: Not on file       Family History:   History reviewed. No pertinent family history.    Review of Systems:  All other review of systems are negative except what was mentioned above in the HPI.    Constitutional: No fever, chills, weight loss ,malaise/fatigue.    HEENT: No new auditory or visual complaints.  Positive for sore throat neck pain dysphagiaRespiratory:No new cough, sputum production, shortness of breath and wheezing.    Cardiovascular: No new chest pain, palpitations, orthopnea and leg swelling.    Gastrointestinal: No heartburn, nausea, vomiting ,abdominal pain, hematochezia or melena     Genitourinary: Negative for dysuria, hematuria    Musculoskeletal: No new arthralgias or myalgias   Skin: Negative for rash and itching.    Neurological: Negative for focal weakness or headaches.    Endo/Heme/Allergies: No abnormal bleed/bruise.    Psychiatric/Behavioral: No new depression/anxiety.    Physical Exam:  Vitals:   /72   Pulse 90   Temp 36.3 °C (97.3 °F) (Temporal)   Resp (!) 24   Ht 1.753 m (5' 9\")   Wt 79 kg (174 lb 2.6 oz)   SpO2 93%   BMI 25.72 kg/m²     General: Not in acute distress, alert and oriented x 3  HEENT: No pallor, icterus. Oropharynx clear.   Neck: Large left neck mass     lymph nodes: No palpable cervical, supraclavicular, axillary or inguinal lymphadenopathy.    CVS: regular rate and rhythm, no rubs or gallops  RESP: Clear to auscultate bilaterally, no wheezing or crackles.   ABD: Soft, non tender, non distended, positive bowel sounds, no palpable organomegaly  EXT: No edema or cyanosis  CNS: Alert and oriented x3, No focal deficits.  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  Molecular markers from the laryngeal biopsy is concerning for high risk myeloma with p53 mutation    He did not have any M spike in the SPEP or UPEP however he had elevated free lambda light chain of 115.  Bone marrow biopsy results pending.    At this point he will need to start radiation as soon as possible given the rapid enlargement.  Recommend high-dose dexamethasone.  He may end up needing short-term feeding tube given the current presentation.  I did discuss with Dr. Walton who will arrange radiation.  No need for systemic chemotherapy during the hospitalization.  He does not have significant anemia hypercalcemia or renal insufficiency etc.    He agreed and verbalized his agreement and understanding with the current plan.  I answered all questions and concerns he has at this time.              Thank you for allowing me to participate in his care.    Please note that this dictation was created using voice recognition software. I have made every reasonable attempt to correct obvious errors, but I expect that there are errors of grammar and possibly content that I did not discover before finalizing the note.      SIGNATURES:  Uli Odonnell M.D.    CC:  Pcp Not In Computer  No ref. provider found

## 2025-01-23 NOTE — ED NOTES
Med Rec complete per patient and spouse at bedside   Allergies reviewed  Antibiotics in the past 30 days:no  Anticoagulant in past 14 days:no  Pharmacy patient utilizes:[facility] in [place], [place]

## 2025-01-23 NOTE — ED PROVIDER NOTES
ED Provider Note    CHIEF COMPLAINT  Chief Complaint   Patient presents with    Oral Swelling     Pt reports increased throat swelling, discomfort, and difficulty swallowing x1 day.     Difficulty Swallowing       EXTERNAL RECORDS REVIEWED  Inpatient Notes ENT note 2024 reviewed patient here for incisional biopsy of neck mass    HPI/ROS  LIMITATION TO HISTORY   Select: : None  OUTSIDE HISTORIAN(S):  Significant other at bedside    Pipo Gould is a 64 y.o. male who presents to the ER due to worsening dysphagia and intolerance of secretions.  Past medical history as document below.  Currently working with oncology Dr. Prescott as well as radiation oncologist Dr. Mccurdy.  Over the last 48 hours patient has had progressive rapid worsening of the above symptoms.  Ultimately prompting ER evaluation.  Denies any difficulty breathing.  Intolerant to liquids.    PAST MEDICAL HISTORY   has a past medical history of Arthritis, Breath shortness, Hypertension, and Pneumonia ().    SURGICAL HISTORY   has a past surgical history that includes fusion big toe,mt-p jt (Left, 2023); remv bone for graft major (Left, 2023); thoracotomy (); laryngoscopy, direct, with biopsy if indicated (N/A, 10/31/2024); and bx/remv,lymph node,deep cerv (Left, 2024).    FAMILY HISTORY  History reviewed. No pertinent family history.    SOCIAL HISTORY  Social History     Tobacco Use    Smoking status: Former     Current packs/day: 0.00     Average packs/day: 1 pack/day for 30.0 years (30.0 ttl pk-yrs)     Types: Cigarettes     Start date:      Quit date:      Years since quittin.0    Smokeless tobacco: Never   Vaping Use    Vaping status: Former   Substance and Sexual Activity    Alcohol use: Not Currently    Drug use: Not Currently    Sexual activity: Not on file       CURRENT MEDICATIONS  Home Medications       Reviewed by Kayce Chamorro R.N. (Registered Nurse) on 25 at 1010  Med List Status:  "Partial     Medication Last Dose Status   acetaminophen (TYLENOL) 325 MG Tab  Active   IBUPROFEN PO  Active   lisinopril (PRINIVIL) 10 MG Tab  Active                  Audit from Redirected Encounters    **Home medications have not yet been reviewed for this encounter**         ALLERGIES  No Known Allergies    PHYSICAL EXAM  VITAL SIGNS: /71   Pulse 94   Temp 36.3 °C (97.3 °F) (Temporal)   Resp 20   Ht 1.753 m (5' 9\")   Wt 79 kg (174 lb 2.6 oz)   SpO2 97%   BMI 25.72 kg/m²          Pulse ox interpretation: I interpret this pulse ox as normal.  Constitutional: Alert in no apparent distress.  HENT: No signs of trauma, Bilateral external ears normal, Nose normal.  Hoarse voice.  Eyes: Pupils are equal and reactive  Neck: Large left neck mass noted.  Cardiovascular: Regular rate and rhythm, no murmurs.   Thorax & Lungs: Hacking cough trying to clear secretions  Skin: Warm, Dry, No erythema, No rash.   Musculoskeletal: Good range of motion in all major joints. No tenderness to palpation or major deformities noted.   Neurologic: Alert , Normal motor function, Normal sensory function, No focal deficits noted.   Psychiatric: Affect normal, Judgment normal, Mood normal.         EKG/LABS  Results for orders placed or performed during the hospital encounter of 25   EKG    Collection Time: 25 10:35 AM   Result Value Ref Range    Report       [facility] Emergency Dept.    Test Date:  2025  Pt Name:    JAMARCUS AUHMADA            Department: ER  MRN:        [id number]                      Room:  Gender:     Male                         Technician: [id number]  :        1960                   Requested By:ER TRIAGE PROTOCOL  Order #:    [id number]                    Reading MD:    Measurements  Intervals                                Axis  Rate:       81                           P:          85  AR:         120                          QRS:        89  QRSD:       92                "            T:          15  QT:         322  QTc:        374    Interpretive Statements  Sinus rhythm  Biatrial enlargement  Borderline right axis deviation  Borderline repolarization abnormality  Compared to ECG 10/31/2024 12:58:25  Atrial abnormality now present     CBC WITH DIFFERENTIAL    Collection Time: 01/23/25 11:48 AM   Result Value Ref Range    WBC 12.7 (H) 4.8 - 10.8 K/uL    RBC 4.29 (L) 4.70 - 6.10 M/uL    Hemoglobin 13.8 (L) 14.0 - 18.0 g/dL    Hematocrit 42.5 42.0 - 52.0 %    MCV 99.1 (H) 81.4 - 97.8 fL    MCH 32.2 27.0 - 33.0 pg    MCHC 32.5 32.3 - 36.5 g/dL    RDW 45.6 35.9 - 50.0 fL    Platelet Count 167 164 - 446 K/uL    MPV 10.1 9.0 - 12.9 fL    Neutrophils-Polys 77.30 (H) 44.00 - 72.00 %    Lymphocytes 8.00 (L) 22.00 - 41.00 %    Monocytes 13.90 (H) 0.00 - 13.40 %    Eosinophils 0.10 0.00 - 6.90 %    Basophils 0.20 0.00 - 1.80 %    Immature Granulocytes 0.50 0.00 - 0.90 %    Nucleated RBC 0.00 0.00 - 0.20 /100 WBC    Neutrophils (Absolute) 9.83 (H) 1.82 - 7.42 K/uL    Lymphs (Absolute) 1.02 1.00 - 4.80 K/uL    Monos (Absolute) 1.77 (H) 0.00 - 0.85 K/uL    Eos (Absolute) 0.01 0.00 - 0.51 K/uL    Baso (Absolute) 0.03 0.00 - 0.12 K/uL    Immature Granulocytes (abs) 0.06 0.00 - 0.11 K/uL    NRBC (Absolute) 0.00 K/uL   BASIC METABOLIC PANEL    Collection Time: 01/23/25 11:48 AM   Result Value Ref Range    Sodium 139 135 - 145 mmol/L    Potassium 4.0 3.6 - 5.5 mmol/L    Chloride 104 96 - 112 mmol/L    Co2 22 20 - 33 mmol/L    Glucose 92 65 - 99 mg/dL    Bun 16 8 - 22 mg/dL    Creatinine 1.24 0.50 - 1.40 mg/dL    Calcium 9.3 8.5 - 10.5 mg/dL    Anion Gap 13.0 7.0 - 16.0   IONIZED CALCIUM    Collection Time: 01/23/25 11:48 AM   Result Value Ref Range    Ionized Calcium 1.2 1.1 - 1.3 mmol/L   MAGNESIUM    Collection Time: 01/23/25 11:48 AM   Result Value Ref Range    Magnesium 2.0 1.5 - 2.5 mg/dL   PHOSPHORUS    Collection Time: 01/23/25 11:48 AM   Result Value Ref Range    Phosphorus 2.2 (L) 2.5 - 4.5  mg/dL   ESTIMATED GFR    Collection Time: 01/23/25 11:48 AM   Result Value Ref Range    GFR (CKD-EPI) 64 >60 mL/min/1.73 m 2         RADIOLOGY/PROCEDURES   I have independently interpreted the diagnostic imaging associated with this visit and am waiting the final reading from the radiologist.   My preliminary interpretation is as follows: Large neck mass with compression on airway and esophagus    Radiologist interpretation:  CT-SOFT TISSUE NECK WITH   Final Result      LEFT laryngeal mass extending into the adjacent soft tissues with destruction of the thyroid cartilage, measuring 4.1 cm in greatest dimension.  Thickening of the LEFT lateral and posterior pharynx may indicate inflammation or tumor, with narrowing of    the aerodigestive tract.  Adjacent inflammatory changes present.          COURSE & MEDICAL DECISION MAKING    ASSESSMENT, COURSE AND PLAN  Care Narrative: pt presenting with mass effect from known neck ca. Will get labs, repeat imaging, and control pain    DISPOSITION AND DISCUSSIONS  I have discussed management of the patient with the following physicians and PJ's:  Dr. Rice    64-year-old male presented to the Emergency Department with neck mass.  Recent biopsy findings of plasma cell oncologic process.  Steroids have been provided here while workup being completed.  CT imaging as above unfortunately does show extending mass.  Given concern over both GI and airway compression I will have the patient brought in under the hospital service for ongoing inpatient care.    FINAL DIAGNOSIS  1. Neck mass         Electronically signed by: Kale Higgins M.D., 1/23/2025 11:34 AM

## 2025-01-23 NOTE — H&P
Hospital Medicine History & Physical Note    Date of Service  1/23/2025    Primary Care Physician  Pcp Not In Computer    Consultants  oncology    Specialist Names: Dr. Odonnell was called from the ER.    Code Status  Full Code    Chief Complaint  Chief Complaint   Patient presents with    Oral Swelling     Pt reports increased throat swelling, discomfort, and difficulty swallowing x1 day.     Difficulty Swallowing       History of Presenting Illness  Pipo Gould is a 64 y.o. male who presented 1/23/2025 with enlarging neck mass.  Mr. Gould has a past medical history of neck mass S/P biopsy by Dr. Ames ENT on 12/19/2024.  Biopsy came back plasma cell neoplasm.  He is followed by Dr. Longoria oncology in the office and underwent a bone marrow biopsy yesterday.  He noticed the past 2 days he has had a marked increase in the size of the neck mass and now to the point where he cannot swallow even liquids.  He presented emergency room with these complaints.  CT reveals the large laryngeal mass extending into the adjacent soft tissues with narrowing of the aerodigestive tract.  He will be placed on high-dose IV Decadron and admitted for a G-tube by radiology.  He will have close monitoring of his airway. Dr. Odonnell oncology consulted from the ER.     His wife is at bedside.    I discussed the plan of care with Dr. Higgins.    Review of Systems  Review of Systems   Constitutional:  Negative for chills and fever.   Gastrointestinal:         Inability to swallow   All other systems reviewed and are negative.      Past Medical History   has a past medical history of Arthritis, Breath shortness, Hypertension, and Pneumonia (2012).    Surgical History   has a past surgical history that includes pr fusion big toe,mt-p jt (Left, 07/06/2023); pr remv bone for graft major (Left, 07/06/2023); thoracotomy (2012); laryngoscopy, direct, with biopsy if indicated (N/A, 10/31/2024); and pr bx/remv,lymph node,deep cerv  (Left, 12/19/2024).     Family History  Mother had lung cancer    Social History   reports that he quit smoking about 13 years ago. His smoking use included cigarettes. He started smoking about 43 years ago. He has a 30 pack-year smoking history. He has never used smokeless tobacco. He reports that he does not currently use alcohol. He reports that he does not currently use drugs.    Allergies  No Known Allergies    Medications  Prior to Admission Medications   Prescriptions Last Dose Informant Patient Reported? Taking?   IBUPROFEN PO  Patient Yes No   Sig: Take  by mouth as needed.   acetaminophen (TYLENOL) 325 MG Tab  Patient Yes No   Sig: Take 325 mg by mouth every 6 hours as needed.   lisinopril (PRINIVIL) 10 MG Tab  Patient Yes No   Sig: Take 10 mg by mouth every morning.      Facility-Administered Medications: None       Physical Exam  Temp:  [36.3 °C (97.3 °F)] 36.3 °C (97.3 °F)  Pulse:  [94] 94  Resp:  [20] 20  BP: (128)/(71) 128/71  SpO2:  [97 %] 97 %  Blood Pressure : 128/71   Temperature: 36.3 °C (97.3 °F)   Pulse: 94   Respiration: 20   Pulse Oximetry: 97 %       Physical Exam  Vitals and nursing note reviewed.   Constitutional:       General: He is not in acute distress.     Appearance: He is ill-appearing. He is not toxic-appearing.   Neck:      Comments: Large, left neck mass  Cardiovascular:      Rate and Rhythm: Normal rate and regular rhythm.   Neurological:      General: No focal deficit present.      Mental Status: He is alert and oriented to person, place, and time.   Psychiatric:         Mood and Affect: Mood normal.         Behavior: Behavior normal.         Laboratory:  Recent Labs     01/23/25  1148   WBC 12.7*   RBC 4.29*   HEMOGLOBIN 13.8*   HEMATOCRIT 42.5   MCV 99.1*   MCH 32.2   MCHC 32.5   RDW 45.6   PLATELETCT 167   MPV 10.1     Recent Labs     01/23/25  1148   SODIUM 139   POTASSIUM 4.0   CHLORIDE 104   CO2 22   GLUCOSE 92   BUN 16   CREATININE 1.24   CALCIUM 9.3     Recent Labs      "01/23/25  1148   GLUCOSE 92         No results for input(s): \"NTPROBNP\" in the last 72 hours.      No results for input(s): \"TROPONINT\" in the last 72 hours.    Imaging:  CT-SOFT TISSUE NECK WITH   Final Result      LEFT laryngeal mass extending into the adjacent soft tissues with destruction of the thyroid cartilage, measuring 4.1 cm in greatest dimension.  Thickening of the LEFT lateral and posterior pharynx may indicate inflammation or tumor, with narrowing of    the aerodigestive tract.  Adjacent inflammatory changes present.      IR-CONSULT AND TREAT    (Results Pending)           Assessment/Plan:  Justification for Admission Status  I anticipate this patient will require at least two midnights for appropriate medical management, necessitating inpatient admission because IV fluids, surgery for a G tube    Patient will need a Med/Surg bed on MEDICAL service .  The need is secondary to as above.    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  Given the degree of obstruction this likely will need to be done by interventional radiology as gastroenterology may not build to pass a tube through his esophagus given the mass effect. This has been ordered.  IV steroids for now and monitor his airway with IV pepcid for GI prophylaxis.  IV fluids.  He had a biopsy done by Dr. Ames ENT in December revealing a plasma cell neoplasm.  He is followed by Dr. Longoria oncology as an outpatient and had bone marrow biopsy done yesterday. I have consulted Dr. Odonnell oncology from the ER.        VTE prophylaxis: SCDs/TEDs  "

## 2025-01-24 ENCOUNTER — APPOINTMENT (OUTPATIENT)
Dept: RADIOLOGY | Facility: MEDICAL CENTER | Age: 65
DRG: 842 | End: 2025-01-24
Attending: STUDENT IN AN ORGANIZED HEALTH CARE EDUCATION/TRAINING PROGRAM
Payer: MEDICARE

## 2025-01-24 ENCOUNTER — HOSPITAL ENCOUNTER (OUTPATIENT)
Dept: RADIATION ONCOLOGY | Facility: MEDICAL CENTER | Age: 65
End: 2025-01-24

## 2025-01-24 DIAGNOSIS — C90.30 NEOPLASM OF UNCERTAIN BEHAVIOR OF PLASMA CELLS (HCC): ICD-10-CM

## 2025-01-24 PROBLEM — Z71.89 ACP (ADVANCE CARE PLANNING): Status: ACTIVE | Noted: 2025-01-24

## 2025-01-24 PROBLEM — E83.39 HYPOPHOSPHATEMIA: Status: ACTIVE | Noted: 2025-01-24

## 2025-01-24 PROBLEM — D72.829 LEUKOCYTOSIS: Status: ACTIVE | Noted: 2025-01-24

## 2025-01-24 PROBLEM — R13.10 DYSPHAGIA: Status: ACTIVE | Noted: 2025-01-24

## 2025-01-24 LAB
CHEMOTHERAPY INFUSION START DATE: NORMAL
CHEMOTHERAPY RECORDS: 3 GY
CHEMOTHERAPY RECORDS: 3000 CGY
CHEMOTHERAPY RECORDS: NORMAL
CHEMOTHERAPY RX CANCER: NORMAL
DATE 1ST CHEMO CANCER: NORMAL
INR PPP: 1.09 (ref 0.87–1.13)
PROTHROMBIN TIME: 14.1 SEC (ref 12–14.6)
RAD ONC ARIA COURSE LAST TREATMENT DATE: NORMAL
RAD ONC ARIA COURSE TREATMENT ELAPSED DAYS: NORMAL
RAD ONC ARIA REFERENCE POINT DOSAGE GIVEN TO DATE: 3 GY
RAD ONC ARIA REFERENCE POINT ID: NORMAL
RAD ONC ARIA REFERENCE POINT SESSION DOSAGE GIVEN: 3 GY

## 2025-01-24 PROCEDURE — 99223 1ST HOSP IP/OBS HIGH 75: CPT | Mod: 25 | Performed by: RADIOLOGY

## 2025-01-24 PROCEDURE — 99233 SBSQ HOSP IP/OBS HIGH 50: CPT | Mod: 25 | Performed by: STUDENT IN AN ORGANIZED HEALTH CARE EDUCATION/TRAINING PROGRAM

## 2025-01-24 PROCEDURE — 74230 X-RAY XM SWLNG FUNCJ C+: CPT

## 2025-01-24 PROCEDURE — A9270 NON-COVERED ITEM OR SERVICE: HCPCS | Performed by: STUDENT IN AN ORGANIZED HEALTH CARE EDUCATION/TRAINING PROGRAM

## 2025-01-24 PROCEDURE — 77470 SPECIAL RADIATION TREATMENT: CPT | Performed by: RADIOLOGY

## 2025-01-24 PROCEDURE — 77334 RADIATION TREATMENT AID(S): CPT | Mod: 26,XE | Performed by: RADIOLOGY

## 2025-01-24 PROCEDURE — 77300 RADIATION THERAPY DOSE PLAN: CPT | Performed by: RADIOLOGY

## 2025-01-24 PROCEDURE — 77334 RADIATION TREATMENT AID(S): CPT | Mod: XE | Performed by: RADIOLOGY

## 2025-01-24 PROCEDURE — 77290 THER RAD SIMULAJ FIELD CPLX: CPT | Mod: 26 | Performed by: RADIOLOGY

## 2025-01-24 PROCEDURE — 770004 HCHG ROOM/CARE - ONCOLOGY PRIVATE *

## 2025-01-24 PROCEDURE — 77290 THER RAD SIMULAJ FIELD CPLX: CPT | Performed by: RADIOLOGY

## 2025-01-24 PROCEDURE — 92610 EVALUATE SWALLOWING FUNCTION: CPT

## 2025-01-24 PROCEDURE — 77412 RADIATION TX DELIVERY LVL 3: CPT | Performed by: RADIOLOGY

## 2025-01-24 PROCEDURE — 99497 ADVNCD CARE PLAN 30 MIN: CPT | Performed by: STUDENT IN AN ORGANIZED HEALTH CARE EDUCATION/TRAINING PROGRAM

## 2025-01-24 PROCEDURE — 77263 THER RADIOLOGY TX PLNG CPLX: CPT | Performed by: RADIOLOGY

## 2025-01-24 PROCEDURE — 77470 SPECIAL RADIATION TREATMENT: CPT | Mod: 26 | Performed by: RADIOLOGY

## 2025-01-24 PROCEDURE — 77295 3-D RADIOTHERAPY PLAN: CPT | Performed by: RADIOLOGY

## 2025-01-24 PROCEDURE — 77280 THER RAD SIMULAJ FIELD SMPL: CPT | Mod: 26,XE | Performed by: RADIOLOGY

## 2025-01-24 PROCEDURE — 77387 GUIDANCE FOR RADJ TX DLVR: CPT | Performed by: RADIOLOGY

## 2025-01-24 PROCEDURE — 700102 HCHG RX REV CODE 250 W/ 637 OVERRIDE(OP): Performed by: STUDENT IN AN ORGANIZED HEALTH CARE EDUCATION/TRAINING PROGRAM

## 2025-01-24 PROCEDURE — 77280 THER RAD SIMULAJ FIELD SMPL: CPT | Performed by: RADIOLOGY

## 2025-01-24 PROCEDURE — 77295 3-D RADIOTHERAPY PLAN: CPT | Mod: 26 | Performed by: RADIOLOGY

## 2025-01-24 PROCEDURE — 92526 ORAL FUNCTION THERAPY: CPT

## 2025-01-24 PROCEDURE — 92611 MOTION FLUOROSCOPY/SWALLOW: CPT

## 2025-01-24 PROCEDURE — 77300 RADIATION THERAPY DOSE PLAN: CPT | Mod: 26 | Performed by: RADIOLOGY

## 2025-01-24 PROCEDURE — 700111 HCHG RX REV CODE 636 W/ 250 OVERRIDE (IP): Mod: JZ | Performed by: HOSPITALIST

## 2025-01-24 RX ADMIN — DEXAMETHASONE SODIUM PHOSPHATE 4 MG: 4 INJECTION INTRA-ARTICULAR; INTRALESIONAL; INTRAMUSCULAR; INTRAVENOUS; SOFT TISSUE at 18:06

## 2025-01-24 RX ADMIN — FAMOTIDINE 20 MG: 10 INJECTION, SOLUTION INTRAVENOUS at 18:06

## 2025-01-24 RX ADMIN — DIBASIC SODIUM PHOSPHATE, MONOBASIC POTASSIUM PHOSPHATE AND MONOBASIC SODIUM PHOSPHATE 500 MG: 852; 155; 130 TABLET ORAL at 18:05

## 2025-01-24 RX ADMIN — DEXAMETHASONE SODIUM PHOSPHATE 4 MG: 4 INJECTION INTRA-ARTICULAR; INTRALESIONAL; INTRAMUSCULAR; INTRAVENOUS; SOFT TISSUE at 04:40

## 2025-01-24 RX ADMIN — FAMOTIDINE 20 MG: 10 INJECTION, SOLUTION INTRAVENOUS at 04:40

## 2025-01-24 RX ADMIN — DEXAMETHASONE SODIUM PHOSPHATE 4 MG: 4 INJECTION INTRA-ARTICULAR; INTRALESIONAL; INTRAMUSCULAR; INTRAVENOUS; SOFT TISSUE at 12:32

## 2025-01-24 RX ADMIN — DEXAMETHASONE SODIUM PHOSPHATE 4 MG: 4 INJECTION INTRA-ARTICULAR; INTRALESIONAL; INTRAMUSCULAR; INTRAVENOUS; SOFT TISSUE at 00:08

## 2025-01-24 ASSESSMENT — COGNITIVE AND FUNCTIONAL STATUS - GENERAL
SUGGESTED CMS G CODE MODIFIER MOBILITY: CH
DAILY ACTIVITIY SCORE: 24
MOBILITY SCORE: 24
SUGGESTED CMS G CODE MODIFIER DAILY ACTIVITY: CH

## 2025-01-24 ASSESSMENT — PAIN DESCRIPTION - PAIN TYPE: TYPE: ACUTE PAIN

## 2025-01-24 NOTE — CARE PLAN
The patient is Watcher - Medium risk of patient condition declining or worsening    Shift Goals  Clinical Goals: CT of ABD, monitor labs, sleep  Patient Goals: CT, sleep    Progress made toward(s) clinical / shift goals:        Problem: Pain - Standard  Goal: Alleviation of pain or a reduction in pain to the patient’s comfort goal  Outcome: Progressing     Problem: Knowledge Deficit - Standard  Goal: Patient and family/care givers will demonstrate understanding of plan of care, disease process/condition, diagnostic tests and medications  Outcome: Progressing       Patient is not progressing towards the following goals:

## 2025-01-24 NOTE — PROGRESS NOTES
4 Eyes Skin Assessment Completed by JENNY Sims and JENNY Kat.    Head WDL  Ears WDL  Nose WDL  Mouth WDL  Neck WDL  Breast/Chest WDL  Shoulder Blades WDL  Spine Left bone marrow biopsy site  (R) Arm/Elbow/Hand WDL  (L) Arm/Elbow/Hand WDL  Abdomen WDL  Groin WDL  Scrotum/Coccyx/Buttocks WDL  (R) Leg WDL  (L) Leg WDL  (R) Heel/Foot/Toe WDL  (L) Heel/Foot/Toe WDL              Interventions In Place N/A    Possible Skin Injury No    Pictures Uploaded Into Epic N/A  Wound Consult Placed N/A  RN Wound Prevention Protocol Ordered No

## 2025-01-24 NOTE — RADIATION COMPLETION NOTES
DATE OF SERVICE: 1/24/2025    DIAGNOSIS:  Multiple myeloma of larynx    DATE OF SERVICE: 1/24/2025    TYPE OF SIMULATION: Head & Neck    GOAL OF TREATMENT:   [] Curative  [x] Palliative  [] Oligometastatic    CONTRAST:    [] IV Contrast*  [] Oral Contrast               POSITION:    [x]  Supine  [] Prone     COMPLEX:  [x] Complex Blocking   [x]Arcs  [] Custom Blocks  [] >3 Sites    PROCEDURE: Patient place in supine position on CT table with head in neutral position. Dentures removed. Shoulder pulls used to stabilize shoulders. Patient head and shoulders were immobilized with a thermoplastic mask.   films evaluated to ensure proper patient position.  CT obtained through entire volume of interest. Exam pushed to treatment planning system for contouring and planning.    I have personally reviewed the relevant data, performed the target localization, and determined all relevant factors for this patient’s simulation.    *Omnipaque 80 -100cc IVP in conjunction with 500cc NS

## 2025-01-24 NOTE — PROGRESS NOTES
Patient brought to [facility] by Gilbert of transport, and received treatment number 1 of 10 to his larynx. Patient was returned to room after treatment. Patient will return on Monday for more treatment.

## 2025-01-24 NOTE — PROGRESS NOTES
[facility] Medicine Daily Progress Note    Date of Service  1/24/2025    Chief Complaint  Pipo Gould is a 64 y.o. male admitted 1/23/2025 with enlarging neck mass with difficulty of swallowing    Hospital Course  Pipo Gould is a 64 y.o. male who presented 1/23/2025 with enlarging neck mass.  Mr. Gould has a past medical history of neck mass S/P biopsy by Dr. Ames ENT on 12/19/2024.  Biopsy came back plasma cell neoplasm.  He is followed by Dr. Longoria oncology in the office and underwent a bone marrow biopsy yesterday.  He noticed the past 2 days he has had a marked increase in the size of the neck mass and now to the point where he cannot swallow even liquids.  He presented emergency room with these complaints.  CT reveals the large laryngeal mass extending into the adjacent soft tissues with narrowing of the aerodigestive tract.  He will be placed on high-dose IV Decadron and admitted for a G-tube by radiology.  He will have close monitoring of his airway. Dr. Odonnell oncology consulted from the ER.     Ct A/P no acute pathology or mass    Interval Problem Update  I have seen and examined the patient at bedside    IR was consulted for PEG tube. However IR is not able to place the tube due to the anatomy. I discussed with both GI and surgery. Per Dr Dubois, Patient will need to be intubated during anesthesia. It may be challenge to extubate him given the neck mass. He may not be a candidate for the trach neither because of the mass. Would try NG tube feeding with the better view of stomach, then may be try PEG with IR again. I discussed the above plan with the patient .     Patient responded to steroids well. He feels less swelling and is able to tolerate liquid. Will have SLP evalu.     I discussed with Dr Walton. Patient had MAP done and Plan for 10 sessions of radiation started today.     Wbc 12  PHOS 2.2    I have discussed this patient's plan of care and discharge plan at [facility]  rounds today with Case Management, Nursing, Nursing leadership, and other members of the IDT team.    Consultants/Specialty  oncology    Code Status  Full Code    Disposition  The patient is not medically cleared for discharge to home or a post-acute facility.      I have placed the appropriate orders for post-discharge needs.    Review of Systems   All 12 systems were reviewed and negative except as mentioned above      Physical Exam  Temp:  [36.4 °C (97.5 °F)-37.5 °C (99.5 °F)] 37 °C (98.6 °F)  Pulse:  [] 79  Resp:  [16-20] 17  BP: (130-150)/(78-84) 146/78  SpO2:  [93 %-95 %] 93 %    Physical Exam  Constitutional:       General: He is in acute distress.      Appearance: He is ill-appearing.   HENT:      Head: Normocephalic.      Nose: Nose normal.      Mouth/Throat:      Mouth: Mucous membranes are moist.   Eyes:      Extraocular Movements: Extraocular movements intact.      Conjunctiva/sclera: Conjunctivae normal.   Cardiovascular:      Rate and Rhythm: Normal rate and regular rhythm.      Pulses: Normal pulses.      Heart sounds: Normal heart sounds.   Pulmonary:      Effort: Pulmonary effort is normal.      Breath sounds: Normal breath sounds.   Abdominal:      General: Bowel sounds are normal.      Palpations: Abdomen is soft.      Tenderness: There is no abdominal tenderness. There is no guarding.   Musculoskeletal:         General: No swelling. Normal range of motion.      Cervical back: Tenderness present. No rigidity.   Skin:     General: Skin is warm.   Neurological:      Mental Status: He is alert and oriented to person, place, and time. Mental status is at baseline.   Psychiatric:         Mood and Affect: Mood normal.         Fluids  No intake or output data in the 24 hours ending 01/24/25 1438     Laboratory  Recent Labs     01/23/25  1148   WBC 12.7*   RBC 4.29*   HEMOGLOBIN 13.8*   HEMATOCRIT 42.5   MCV 99.1*   MCH 32.2   MCHC 32.5   RDW 45.6   PLATELETCT 167   MPV 10.1     Recent Labs      01/23/25  1148   SODIUM 139   POTASSIUM 4.0   CHLORIDE 104   CO2 22   GLUCOSE 92   BUN 16   CREATININE 1.24   CALCIUM 9.3     Recent Labs     01/23/25  1148   INR 1.09               Imaging  CT-ABDOMEN W/O   Final Result         1.  Pericardial calcification, consider changes related to prior pericarditis.   2.  Atherosclerosis and atherosclerotic coronary artery disease      CT-SOFT TISSUE NECK WITH   Final Result      LEFT laryngeal mass extending into the adjacent soft tissues with destruction of the thyroid cartilage, measuring 4.1 cm in greatest dimension.  Thickening of the LEFT lateral and posterior pharynx may indicate inflammation or tumor, with narrowing of    the aerodigestive tract.  Adjacent inflammatory changes present.      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  Given the degree of obstruction this likely will need to be done by interventional radiology as gastroenterology may not build to pass a tube through his esophagus given the mass effect. This has been ordered.  IV steroids for now and monitor his airway with IV pepcid for GI prophylaxis.  IV fluids.  He had a biopsy done by Dr. Ames ENT in December revealing a plasma cell neoplasm.  He is followed by Dr. Longoria oncology as an outpatient and had bone marrow biopsy done yesterday. I have consulted Dr. Odonnell oncology from the ER.    Hypophosphatemia  Assessment & Plan  replaced    Leukocytosis  Assessment & Plan  No infection signs  Likely due to steroids    Dysphagia  Assessment & Plan  Due to neck mass compression      IR was consulted for PEG tube. However IR is not able to place the tube due to the anatomy. I discussed with both GI and surgery. Per Dr Dubois, Patient will need to be  intubated during anesthesia. It may be challenge to extubate him given the neck mass. He may not be a candidate for the trach neither because of the mass. Would try NG tube feeding with the better view of stomach, then may be try PEG with IR again. I discussed the above plan with the patient .     Patient responded to steroids well. He feels less swelling and is able to tolerate liquid. Will have SLP evalu.     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 Age of 64. I discussed the code status with him, including the risks and benefits. He wants to stay full code, including CPR, intubation/mechanical ventilation. ACP 16MIN         VTE prophylaxis: LOVENOX    I have performed a physical exam and reviewed and updated ROS and Plan today (1/24/2025). In review of yesterday's note (1/23/2025), there are no changes except as documented above.    I spent greater than 52 minutes for chart review, obtaining history independently, performing medically appropriate examination,  documenting , ordering medications, tests, or procedures, referring and communicating with other health care professionals, Independently interpreting results and communicating results with patient/family/caregiver. More than 50% of time was spent in face-to-face clinical encounter.

## 2025-01-24 NOTE — PROGRESS NOTES
Patient came down to Radiation Oncology Department for Radiation Therapy SIM appt. Will start treatment this afternoon @ 3:15pm.

## 2025-01-24 NOTE — PROGRESS NOTES
Oncology/Hematology Progress Note               Author: Uli Odonnell M.D. Date & Time created: 2025  12:18 PM     CC - plasmacytoma  Interval History:    He feels better with dexamethasone.  G-tube placement technically difficult hence deferred.  He starting radiation  Review of Systems:  ROS positive for dysphagia otherwise no major myeloma related adverse effects    Physical Exam:  Physical Exam awake alert oriented to 3 chest clear to auscultation abdomen soft nontender neck with mass present.  Extremities with no edema    Labs:          Recent Labs     25  1148   SODIUM 139   POTASSIUM 4.0   CHLORIDE 104   CO2 22   BUN 16   CREATININE 1.24   MAGNESIUM 2.0   PHOSPHORUS 2.2*   CALCIUM 9.3     Recent Labs     25  1148   GLUCOSE 92     Recent Labs     25  1148   RBC 4.29*   HEMOGLOBIN 13.8*   HEMATOCRIT 42.5   PLATELETCT 167   PROTHROMBTM 14.1   INR 1.09     Recent Labs     25  1148   WBC 12.7*   NEUTSPOLYS 77.30*   LYMPHOCYTES 8.00*   MONOCYTES 13.90*   EOSINOPHILS 0.10   BASOPHILS 0.20     Recent Labs     25  1148   SODIUM 139   POTASSIUM 4.0   CHLORIDE 104   CO2 22   GLUCOSE 92   BUN 16   CREATININE 1.24   CALCIUM 9.3     Hemodynamics:  Temp (24hrs), Av.1 °C (98.7 °F), Min:36.4 °C (97.5 °F), Max:37.5 °C (99.5 °F)  Temperature: 37 °C (98.6 °F)  Pulse  Av.7  Min: 79  Max: 100   Blood Pressure : (!) 146/78 (Rn notified)     Respiratory:    Respiration: 17, Pulse Oximetry: 93 %        RUL Breath Sounds: Diminished, RML Breath Sounds: Clear, RLL Breath Sounds: Diminished, TANYA Breath Sounds: Diminished, LLL Breath Sounds: Diminished  Fluids:  No intake or output data in the 24 hours ending 25 1218     GI/Nutrition:  Orders Placed This Encounter   Procedures    Diet NPO Restrict to: Strict     Standing Status:   Standing     Number of Occurrences:   1     Order Specific Question:   Diet NPO Restrict to:     Answer:   Strict [1]     Medical Decision Making, by  Problem:  Active Hospital Problems    Diagnosis     *Neoplasm of uncertain behavior of plasma cells (HCC) [C90.30]        Plan:  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  Molecular markers from the laryngeal biopsy is concerning for high risk myeloma with p53 mutation     He did not have any M spike in the SPEP or UPEP however he had elevated free lambda light chain of 115.  Bone marrow biopsy results pending.    Dysphagia subjectively better with steroids he is starting radiation hence hopefully he will not need the tube placement which will be anatomically difficult.  He can be tapered off of steroids over the next few days  He will follow-up with Dr. Longoria as outpt next week to initiate systemic treatment for his high risk myeloma    Quality-Core Measures

## 2025-01-24 NOTE — ASSESSMENT & PLAN NOTE
Patiend was admitted with obstructive neck mass due to High risk aggressive multiple myeloma with a large laryngeal based plasmacytoma. Age of 64. I discussed the code status with him, including the risks and benefits. He wants to stay full code, including CPR, intubation/mechanical ventilation. ACP 16MIN

## 2025-01-24 NOTE — DISCHARGE PLANNING
Care Transition Team Assessment    Patient is a 64 year old male admitted for neoplasm of uncertain behavior of plasma cells. Please see patient's H&P for prior medical history. RNCM met with patient at bedside to complete assessment. Patient is A/Ox4 and able to verify information on the face sheet. Patient lives with his spouse in a single story house with 5 steps to enter, Kate Taylor ([phone]; emergency contact and NOK. No advanced directive on file. Patient reports, prior to admission patient is independent with ADL's and IADL's. Patient does not use any DME at baseline. Patient is working full time. Patient reports his spouse is good support. Patient's PCP is Zain Amaral in [facility]. Patient's preferred pharmacy is [facility] in [place]. Patient denies a history of SNF/IPR nor HHC use in the past. Patient denies any SA or MH concerns. Patient confirmed medical coverage via Medicare. Patient means to transportation and spouse will provide transport once medically stable for discharge. Patient's primary oncologist is Dr. Longoria at [facility].   1227: Patient was discussed in IDT rounds, patient is starting radiation today at 1515 for a total of 10 fractions. Once medically cleared, patient understands he will drive daily to [facility] for radiation. Patient is on steroids and is pending a speech evaluation.     Information Source  Orientation Level: Oriented X4  Information Given By: Patient  Who is responsible for making decisions for patient? : Patient    Readmission Evaluation  Is this a readmission?: No    Elopement Risk  Legal Hold: No  Ambulatory or Self Mobile in Wheelchair: Yes  Disoriented: No  Psychiatric Symptoms: None  History of Wandering: No  Elopement this Admit: No  Vocalizing Wanting to Leave: No  Displays Behaviors, Body Language Wanting to Leave: No-Not at Risk for Elopement  Elopement Risk: Not at Risk for Elopement    Interdisciplinary Discharge Planning  Lives with - Patient's Self Care  Capacity: Spouse  Patient or legal guardian wants to designate a caregiver: No  Support Systems: Children, Family Member(s)  Housing / Facility: [address] [place] [place] [address]    Discharge Preparedness  What is your plan after discharge?: Home with help  What are your discharge supports?: Spouse  Prior Functional Level: Ambulatory, Independent with Activities of Daily Living, Independent with Medication Management  Difficulity with ADLs: None  Difficulity with IADLs: None    Functional Assesment  Prior Functional Level: Ambulatory, Independent with Activities of Daily Living, Independent with Medication Management    Finances  Financial Barriers to Discharge: No  Prescription Coverage: Yes    Vision / Hearing Impairment  Vision Impairment : Yes  Right Eye Vision: Wears Glasses  Left Eye Vision: Wears Glasses  Hearing Impairment : No         Advance Directive  Advance Directive?: None  Advance Directive offered?: AD Booklet refused    Domestic Abuse  Have you ever been the victim of abuse or violence?: No  Possible Abuse/Neglect Reported to:: Not Applicable    Psychological Assessment  History of Substance Abuse: None  History of Psychiatric Problems: No  Non-compliant with Treatment: No  Newly Diagnosed Illness: No    Discharge Risks or Barriers  Discharge risks or barriers?: No  Patient risk factors: Complex medical needs    Anticipated Discharge Information  Discharge Disposition: Discharged to home/self care (01)

## 2025-01-24 NOTE — CT SIMULATION
PATIENT NAME Pipo Gould   PRIMARY PHYSICIAN Pcp Not In Computer [id number]   REFERRING PHYSICIAN No ref. provider found 1960     Myeloma of laynx         Treatment Planning CT Simulation        Order Questions       Question Answer Comment    Is this for a new course of treatment? Yes     Is this an Addendum? No     Implanted Device/Pacemaker No     Simulation Status Initial     Planned Start Date 1/27/2025     Treatment Site Larynx     Laterality Axial     Treatment Technique 3D CRT     Treatment Pattern/Frequency Daily 10 fractions    Concurrent Chemotherapy No     CT Technique 3D     Slice Thickness 2mm     Scan Extent H&N     Treatment Device(s) S-Frame Mask     Patient Attire Gown     Patient Position Supine     Patient Orientation Head First     Arm Position Down     Dentures Out     Chin Position Neutral     Treatment Image Guidance CBCT     Frequency (CBCT) Daily     Image Guidance Match Bone     Treatment Planning Image Fusion CT/CT     Other Orders Special Tx Procedure sequential chemo     Weekly Physics Check                   Comments       Push to [facility] SM FOV. Inform physician after pushed. Will review in [facility]

## 2025-01-24 NOTE — ASSESSMENT & PLAN NOTE
Due to neck mass compression      IR was consulted for PEG tube. However IR is not able to place the tube due to the anatomy. I discussed with both GI and surgery. Per Dr Dubois, Patient will need to be intubated during anesthesia. It may be challenge to extubate him given the neck mass. He may not be a candidate for the trach neither because of the mass. Would try NG tube feeding with the better view of stomach, then may be try PEG with IR again. I discussed the above plan with the patient .     Patient responded to steroids well. He feels less swelling and is able to tolerate liquid. Will have SLP evalu.     Continue iv steroids  Monitor BG BP  Continue pepcid for GI ppx

## 2025-01-24 NOTE — RADIATION COMPLETION NOTES
PATIENT NAME Pipo Gould   PRIMARY PHYSICIAN Pcp Not In Computer [id number]   REFERRING PHYSICIAN No ref. provider found 1960       DATE OF SERVICE: 1/24/2025    DIAGNOSIS:  Multiple myeloma    SPECIAL TREATMENT PROCEDURE NOTE:  Considerable additional effort required in the management of this case because of administration of sequential chemotherapy which may result in increased normal tissue toxicity. This includes longer and possibly more frequent on treatment visits.

## 2025-01-25 ENCOUNTER — PHARMACY VISIT (OUTPATIENT)
Dept: PHARMACY | Facility: MEDICAL CENTER | Age: 65
End: 2025-01-25
Payer: COMMERCIAL

## 2025-01-25 VITALS
DIASTOLIC BLOOD PRESSURE: 68 MMHG | BODY MASS INDEX: 24.75 KG/M2 | HEART RATE: 72 BPM | RESPIRATION RATE: 16 BRPM | WEIGHT: 167.11 LBS | SYSTOLIC BLOOD PRESSURE: 112 MMHG | HEIGHT: 69 IN | OXYGEN SATURATION: 93 % | TEMPERATURE: 97.4 F

## 2025-01-25 LAB
ANION GAP SERPL CALC-SCNC: 12 MMOL/L (ref 7–16)
BUN SERPL-MCNC: 37 MG/DL (ref 8–22)
CALCIUM SERPL-MCNC: 8.9 MG/DL (ref 8.5–10.5)
CHLORIDE SERPL-SCNC: 105 MMOL/L (ref 96–112)
CO2 SERPL-SCNC: 20 MMOL/L (ref 20–33)
CREAT SERPL-MCNC: 1.06 MG/DL (ref 0.5–1.4)
ERYTHROCYTE [DISTWIDTH] IN BLOOD BY AUTOMATED COUNT: 45.2 FL (ref 35.9–50)
GFR SERPLBLD CREATININE-BSD FMLA CKD-EPI: 77 ML/MIN/1.73 M 2
GLUCOSE SERPL-MCNC: 137 MG/DL (ref 65–99)
HCT VFR BLD AUTO: 37.9 % (ref 42–52)
HGB BLD-MCNC: 12.6 G/DL (ref 14–18)
MAGNESIUM SERPL-MCNC: 2.3 MG/DL (ref 1.5–2.5)
MCH RBC QN AUTO: 32.1 PG (ref 27–33)
MCHC RBC AUTO-ENTMCNC: 33.2 G/DL (ref 32.3–36.5)
MCV RBC AUTO: 96.7 FL (ref 81.4–97.8)
PHOSPHATE SERPL-MCNC: 2.8 MG/DL (ref 2.5–4.5)
PLATELET # BLD AUTO: 167 K/UL (ref 164–446)
PMV BLD AUTO: 10.5 FL (ref 9–12.9)
POTASSIUM SERPL-SCNC: 4.3 MMOL/L (ref 3.6–5.5)
RBC # BLD AUTO: 3.92 M/UL (ref 4.7–6.1)
SODIUM SERPL-SCNC: 137 MMOL/L (ref 135–145)
WBC # BLD AUTO: 15.1 K/UL (ref 4.8–10.8)

## 2025-01-25 PROCEDURE — 700102 HCHG RX REV CODE 250 W/ 637 OVERRIDE(OP): Performed by: STUDENT IN AN ORGANIZED HEALTH CARE EDUCATION/TRAINING PROGRAM

## 2025-01-25 PROCEDURE — 83735 ASSAY OF MAGNESIUM: CPT

## 2025-01-25 PROCEDURE — A9270 NON-COVERED ITEM OR SERVICE: HCPCS | Performed by: STUDENT IN AN ORGANIZED HEALTH CARE EDUCATION/TRAINING PROGRAM

## 2025-01-25 PROCEDURE — RXMED WILLOW AMBULATORY MEDICATION CHARGE: Performed by: STUDENT IN AN ORGANIZED HEALTH CARE EDUCATION/TRAINING PROGRAM

## 2025-01-25 PROCEDURE — 84100 ASSAY OF PHOSPHORUS: CPT

## 2025-01-25 PROCEDURE — 85027 COMPLETE CBC AUTOMATED: CPT

## 2025-01-25 PROCEDURE — 80048 BASIC METABOLIC PNL TOTAL CA: CPT

## 2025-01-25 PROCEDURE — 36415 COLL VENOUS BLD VENIPUNCTURE: CPT

## 2025-01-25 PROCEDURE — 99239 HOSP IP/OBS DSCHRG MGMT >30: CPT | Performed by: STUDENT IN AN ORGANIZED HEALTH CARE EDUCATION/TRAINING PROGRAM

## 2025-01-25 PROCEDURE — 700111 HCHG RX REV CODE 636 W/ 250 OVERRIDE (IP): Performed by: HOSPITALIST

## 2025-01-25 RX ORDER — POLYETHYLENE GLYCOL 3350 17 G/17G
1 POWDER, FOR SOLUTION ORAL
Status: DISCONTINUED | OUTPATIENT
Start: 2025-01-25 | End: 2025-01-25 | Stop reason: HOSPADM

## 2025-01-25 RX ORDER — DEXAMETHASONE 4 MG/1
4 TABLET ORAL 2 TIMES DAILY
Qty: 10 TABLET | Refills: 0 | Status: SHIPPED | OUTPATIENT
Start: 2025-01-25 | End: 2025-01-30

## 2025-01-25 RX ORDER — BISACODYL 10 MG
10 SUPPOSITORY, RECTAL RECTAL DAILY
Status: DISCONTINUED | OUTPATIENT
Start: 2025-01-25 | End: 2025-01-25 | Stop reason: HOSPADM

## 2025-01-25 RX ORDER — SENNA AND DOCUSATE SODIUM 50; 8.6 MG/1; MG/1
1 TABLET, FILM COATED ORAL 2 TIMES DAILY
Qty: 60 TABLET | Refills: 0 | Status: SHIPPED | OUTPATIENT
Start: 2025-01-25 | End: 2025-02-24

## 2025-01-25 RX ORDER — FAMOTIDINE 20 MG/1
20 TABLET, FILM COATED ORAL DAILY
Qty: 30 TABLET | Refills: 0 | Status: SHIPPED | OUTPATIENT
Start: 2025-01-25 | End: 2025-02-24

## 2025-01-25 RX ORDER — SENNOSIDES A AND B 8.6 MG/1
8.6 TABLET, FILM COATED ORAL
Status: DISCONTINUED | OUTPATIENT
Start: 2025-01-25 | End: 2025-01-25 | Stop reason: HOSPADM

## 2025-01-25 RX ORDER — POLYETHYLENE GLYCOL 3350 17 G/17G
17 POWDER, FOR SOLUTION ORAL
Qty: 30 PACKET | Refills: 0 | Status: SHIPPED | OUTPATIENT
Start: 2025-01-25

## 2025-01-25 RX ADMIN — DEXAMETHASONE SODIUM PHOSPHATE 4 MG: 4 INJECTION INTRA-ARTICULAR; INTRALESIONAL; INTRAMUSCULAR; INTRAVENOUS; SOFT TISSUE at 04:51

## 2025-01-25 RX ADMIN — DIBASIC SODIUM PHOSPHATE, MONOBASIC POTASSIUM PHOSPHATE AND MONOBASIC SODIUM PHOSPHATE 500 MG: 852; 155; 130 TABLET ORAL at 04:51

## 2025-01-25 RX ADMIN — FAMOTIDINE 20 MG: 10 INJECTION, SOLUTION INTRAVENOUS at 04:51

## 2025-01-25 RX ADMIN — BISACODYL 10 MG: 10 SUPPOSITORY RECTAL at 10:22

## 2025-01-25 RX ADMIN — DEXAMETHASONE SODIUM PHOSPHATE 4 MG: 4 INJECTION INTRA-ARTICULAR; INTRALESIONAL; INTRAMUSCULAR; INTRAVENOUS; SOFT TISSUE at 01:11

## 2025-01-25 ASSESSMENT — PAIN DESCRIPTION - PAIN TYPE: TYPE: ACUTE PAIN

## 2025-01-25 NOTE — CARE PLAN
The patient is Stable - Low risk of patient condition declining or worsening    Shift Goals  Clinical Goals: CT of ABD, monitor labs, sleep  Patient Goals: CT, sleep    Progress made toward(s) clinical / shift goals:  education done on POC, all questions and concerns were addressed.       Problem: Pain - Standard  Goal: Alleviation of pain or a reduction in pain to the patient’s comfort goal  Outcome: Progressing     Problem: Knowledge Deficit - Standard  Goal: Patient and family/care givers will demonstrate understanding of plan of care, disease process/condition, diagnostic tests and medications  Outcome: Progressing       Patient is not progressing towards the following goals:

## 2025-01-25 NOTE — CONSULTS
RADIATION ONCOLOGY CONSULT    DATE OF SERVICE: 1/24/2025    IDENTIFICATION: A 64 y.o. male with multiple myeloma    He is here at the kind request of Dr. Odonnell    HISTORY OF PRESENT ILLNESS:   Patient is a 64-year-old male recently diagnosed with multiple myeloma left renal mass biopsy showed aggressive plasmacytoma PET/CT showed 4.8 cm hypermetabolic soft tissue mass in the left neck with destruction of left cricoid cartilage.  4 consistent lytic lesions.  Had bone marrow biopsy yesterday but due to rapid marginal mass went to the ER, started on steroids with some good response.    PAST MEDICAL HISTORY:  Past Medical History:   Diagnosis Date    Arthritis     Breath shortness     with exertion    Hypertension     Pneumonia 2012       PAST SURGICAL HISTORY:  Past Surgical History:   Procedure Laterality Date    NV BX/REMV,LYMPH NODE,DEEP CERV Left 12/19/2024    Procedure: BIOPSY OR EXCISION OF LYMPH NODES; OPEN DEEP CERVICAL NODES, LEFT;  Surgeon: Bryan Ames M.D.;  Location: SURGERY SAME DAY [facility];  Service: Ent    LARYNGOSCOPY, DIRECT, WITH BIOPSY IF INDICATED N/A 10/31/2024    Procedure: MICROSCOPIC DIRECT LARYNGOSCOPY AND ESOPHAGOSCOPY;  Surgeon: Bryan Ames M.D.;  Location: SURGERY SAME DAY [facility];  Service: Ent    PB FUSION BIG TOE,MT-P JT Left 07/06/2023    Procedure: LEFT GREAT METATARSOPHALANGEAL JOINT FUSION;  Surgeon: Qasim Azul M.D.;  Location: [facility];  Service: Orthopedics    PB REMV BONE FOR GRAFT MAJOR Left 07/06/2023    Procedure: LEFT CALCANEAL AUTOGRAFT;  Surgeon: Qasim Azul M.D.;  Location: [facility];  Service: Orthopedics    THORACOTOMY  2012       CURRENT MEDICATIONS:  Current Facility-Administered Medications   Medication Dose Route Frequency Provider Last Rate Last Admin    phosphorus (K-Phos-Neutral) per tablet 500 mg  500 mg Oral TID Zakia Dennis M.D.        dexamethasone (Decadron) injection 4 mg  4 mg Intravenous  "Q6HRS Gavin Rice M.D.   4 mg at 01/24/25 1232    famotidine (Pepcid) injection 20 mg  20 mg Intravenous BID Gavin Rice M.D.   20 mg at 01/24/25 0440    morphine 4 MG/ML injection 4 mg  4 mg Intravenous Q3HRS PRN Gavin Rice M.D.           ALLERGIES:    Patient has no known allergies.    FAMILY HISTORY:    family history is not on file.    SOCIAL HISTORY:     reports that he quit smoking about 13 years ago. His smoking use included cigarettes. He started smoking about 43 years ago. He has a 30 pack-year smoking history. He has never used smokeless tobacco. He reports that he does not currently use alcohol. He reports that he does not currently use drugs.    REVIEW OF SYSTEMS:    Comprehensive review of systems is negative with the exception of the aforementioned HPI, PMH, and PSH bullets in accordance with CMS guidelines.      PHYSICAL EXAM:    ECOG PERFORMANCE STATUS:  ECOG1    BP (!) 144/71   Pulse 100   Temp 36.5 °C (97.7 °F) (Temporal)   Resp 18   Ht 1.753 m (5' 9\")   Wt 79 kg (174 lb 2.6 oz)   SpO2 93%   BMI 25.72 kg/m²   Physical Exam  Vitals reviewed.   HENT:      Head: Normocephalic.      Mouth/Throat:      Mouth: Mucous membranes are moist.   Cardiovascular:      Rate and Rhythm: Normal rate.   Abdominal:      General: Abdomen is flat.   Musculoskeletal:         General: Normal range of motion.   Neurological:      General: No focal deficit present.      Mental Status: He is alert.   Psychiatric:         Mood and Affect: Mood normal.          LABORATORY DATA:   Lab Results   Component Value Date/Time    WBC 12.7 (H) 01/23/2025 1148    HEMOGLOBIN 13.8 (L) 01/23/2025 1148    HEMATOCRIT 42.5 01/23/2025 1148    MCV 99.1 (H) 01/23/2025 1148    PLATELETCT 167 01/23/2025 1148    NEUTS 9.83 (H) 01/23/2025 1148      Lab Results   Component Value Date/Time    SODIUM 139 01/23/2025 1148    SODIUM 139 10/31/2024 1234    POTASSIUM 4.0 01/23/2025 1148    POTASSIUM 4.2 10/31/2024 1234    BUN 16 01/23/2025 " 1148    BUN 17 10/31/2024 1234    CREATININE 1.24 01/23/2025 1148    CREATININE 1.10 10/31/2024 1234    CALCIUM 9.3 01/23/2025 1148    CALCIUM 9.5 10/31/2024 1234       RADIOLOGY DATA:  CT-ABDOMEN W/O    Result Date: 1/24/2025 1/23/2025 9:56 PM HISTORY/REASON FOR EXAM:  CT to assess for window for possible IR gastrostomy tube placement. TECHNIQUE/EXAM DESCRIPTION: CT scan of the abdomen and pelvis without contrast. Noncontrast helical scanning was obtained from the diaphragmatic domes through the pubic symphysis. Low dose optimization technique was utilized for this CT exam including automated exposure control and adjustment of the mA and/or kV according to patient size. COMPARISON: None. FINDINGS: Lower Chest: Linear density at the left lung base is seen favoring changes of atelectasis or scarring. There is calcified right lower lobe granuloma identified which 15. There is 6.5 mm nodule with central calcification in the right lower lobe on image 19, appearance favoring small granuloma. Pericardial calcification is noted. Liver: Normal. Spleen: Unremarkable. Pancreas: Unremarkable. Gallbladder: No calcified stones. Biliary: Nondilated. Adrenal glands: Normal. Kidneys: Unremarkable without hydronephrosis. Bowel: No obstruction or acute inflammation. The appendix appears within normal limits. Lymph nodes: No adenopathy. Vasculature: Atherosclerotic changes are seen including atherosclerotic coronary artery calcifications. Peritoneum: Unremarkable without ascites. Musculoskeletal: No acute or destructive process. Pelvis: No adenopathy or free fluid.     1.  Pericardial calcification, consider changes related to prior pericarditis. 2.  Atherosclerosis and atherosclerotic coronary artery disease    CT-SOFT TISSUE NECK WITH    Result Date: 1/23/2025 1/23/2025 12:45 PM HISTORY/REASON FOR EXAM:  dysphagia.  Throat swelling. TECHNIQUE/EXAM DESCRIPTION AND NUMBER OF VIEWS:  CT soft tissue neck with contrast. The study was  performed on a helical multidetector CT scanner. Contiguous thin section helical images were obtained of the neck from the skull base through the thoracic inlet. 80 mL of Omnipaque 350 nonionic contrast was injected intravenously. Low dose optimization technique was utilized for this CT exam including automated exposure control and adjustment of the mA and/or kV according to patient size. COMPARISON: None. FINDINGS: The visualized orbits are unremarkable. The visualized paranasal sinuses are clear. The visualized mastoids are unremarkable. Extensive LEFT pharyngeal mucosal thickening in the extending into the larynx with irregular peripherally enhancing mass in the LEFT lateral aspect of larynx.  Mass measures 3.4 x 2.6 x 4.1 cm and displaces larynx to the RIGHT.  Adjacent edema extends into the deep subcutaneous soft tissues and parapharyngeal region.  Destruction of the LEFT thyroid cartilage noted. The patient is edentulous. Thyroid gland is unremarkable. Parotid and submandibular glands are within normal limits.  Minimal edema adjacent the LEFT submandibular gland. Degenerative change of cervical spine with straightening. Visualized lung apices show minimal emphysematous changes.     LEFT laryngeal mass extending into the adjacent soft tissues with destruction of the thyroid cartilage, measuring 4.1 cm in greatest dimension.  Thickening of the LEFT lateral and posterior pharynx may indicate inflammation or tumor, with narrowing of the aerodigestive tract.  Adjacent inflammatory changes present.      IMPRESSION:    64 y.o. male with multiple myeloma      RECOMMENDATIONS:   I discussed the role of palliative radiation for treatment of multiple myeloma with large laryngeal mass causing airway compromise and hoarseness.  We will plan for 30 Gray in 10 fractions.  Patient will undergo mapping today with plan to start treatment today as well he will continue treatment as outpatient.  We discussed risk benefits patient  is amenable to treatment.    Thank you for the opportunity to participate in his care.  If any questions or comments, please do not hesitate in calling.

## 2025-01-25 NOTE — DISCHARGE SUMMARY
Discharge Summary    CHIEF COMPLAINT ON ADMISSION  Chief Complaint   Patient presents with    Oral Swelling     Pt reports increased throat swelling, discomfort, and difficulty swallowing x1 day.     Difficulty Swallowing       Reason for Admission  Dizziness; Throat Swelling     Admission Date  1/23/2025    CODE STATUS  Prior    HPI & HOSPITAL COURSE  Pipo Gould is a 64 y.o. male who presented 1/23/2025 with enlarging neck mass. Mr. Gould has a past medical history of neck mass S/P biopsy by Dr. Ames ENT on 12/19/2024.  Biopsy came back plasma cell neoplasm.  He is followed by Dr. Longoria oncology in the office and underwent a bone marrow biopsy yesterday.  He noticed the past 2 days he has had a marked increase in the size of the neck mass and now to the point where he cannot swallow even liquids.  He presented emergency room with these complaints.  CT reveals the large laryngeal mass extending into the adjacent soft tissues with narrowing of the aerodigestive tract.  He was started on high-dose IV Decadron and admitted for a G-tube by radiology.   Ct A/P no acute pathology or mass.       IR was consulted for PEG tube. However IR is not able to place the tube due to the anatomy. I discussed with both GI and surgery. Per Dr Dubois, Patient will need to be intubated during anesthesia. It may be challenge to extubate him given the neck mass. He may not be a candidate for the trach neither because of the mass. Would try NG tube feeding with the better view of stomach, then may be try PEG with IR again. I discussed the above plan with the patient .     Fortunately, Patient responded to steroids well.  His neck swelling went down.  SLP consulted, he passed the barium test.  He is able to tolerate regular diet.      I discussed with Dr Walton. Patient had MAP done and Plan for 10 sessions of radiation.  He had the first radiation treatment yesterday.  He will continue outpatient radiation after  discharge.     Therefore, he is discharged in good and stable condition to home with close outpatient follow-up.    The patient met 2-midnight criteria for an inpatient stay at the time of discharge.    Discharge Date  1/25/2025    FOLLOW UP ITEMS POST DISCHARGE  - Follow up with primary care physician in 1 week.   - Follow up with radiation oncologist as instructed  - Please take the medications as instructed    - Go to the local Emergency Department if you have any worsening condition.     DISCHARGE DIAGNOSES  Principal Problem:    Neoplasm of uncertain behavior of plasma cells (HCC) (POA: Yes)  Active Problems:    Dysphagia (POA: Unknown)    Leukocytosis (POA: Unknown)    Hypophosphatemia (POA: Unknown)    ACP (advance care planning) (POA: Unknown)  Resolved Problems:    * No resolved hospital problems. *      FOLLOW UP  Future Appointments   Date Time Provider Department Center   1/27/2025 12:45 PM RADIATION THERAPY RADT None   1/28/2025  4:00 PM RADIATION THERAPY RADT None   1/29/2025  4:15 PM RADIATION THERAPY RADT None   1/30/2025  4:15 PM RADIATION THERAPY RADT None   1/31/2025  4:15 PM RADIATION THERAPY RADT None   2/3/2025  4:45 PM RADIATION THERAPY RADT None   2/4/2025  4:30 PM RADIATION THERAPY RADT None   2/5/2025  3:45 PM RADIATION THERAPY RADT None   2/6/2025  3:45 PM RADIATION THERAPY RADT None     No follow-up provider specified.    MEDICATIONS ON DISCHARGE     Medication List        START taking these medications        Instructions   dexamethasone 4 MG Tabs  Commonly known as: Decadron   Take 1 Tablet by mouth 2 times a day for 5 days.  Dose: 4 mg     famotidine 20 MG Tabs  Commonly known as: Pepcid   Take 1 Tablet by mouth every day for 30 days.  Dose: 20 mg     PEG 3350 Pack   Take 1 Packet by mouth 1 time a day as needed (Constipation).  Dose: 17 g     Stimulant Laxative 8.6-50 MG Tabs  Generic drug: senna-docusate   Take 1 Tablet by mouth 2 times a day for 30 days.  Dose: 1 Tablet             CONTINUE taking these medications        Instructions   acetaminophen 500 MG Tabs  Commonly known as: Tylenol   Take 500 mg by mouth every 6 hours as needed for Mild Pain.  Dose: 500 mg     lisinopril 10 MG Tabs  Commonly known as: Prinivil   Take 10 mg by mouth every morning.  Dose: 10 mg     oxyCODONE immediate-release 5 MG Tabs  Commonly known as: Roxicodone   Take 5 mg by mouth one time as needed for Severe Pain.  Dose: 5 mg              Allergies  No Known Allergies    DIET  No orders of the defined types were placed in this encounter.      ACTIVITY  As tolerated.  Weight bearing as tolerated    CONSULTATIONS  Radiation oncology, IR    PROCEDURES      LABORATORY  Lab Results   Component Value Date    SODIUM 137 01/25/2025    POTASSIUM 4.3 01/25/2025    CHLORIDE 105 01/25/2025    CO2 20 01/25/2025    GLUCOSE 137 (H) 01/25/2025    BUN 37 (H) 01/25/2025    CREATININE 1.06 01/25/2025        Lab Results   Component Value Date    WBC 15.1 (H) 01/25/2025    HEMOGLOBIN 12.6 (L) 01/25/2025    HEMATOCRIT 37.9 (L) 01/25/2025    PLATELETCT 167 01/25/2025        Total time of the discharge process exceeds 36 minutes.

## 2025-01-25 NOTE — PROCEDURES
DATE OF SERVICE: 1/24/2025    Radiation Therapy Episodes       Active Episodes       Radiation Therapy: 3D CRT (1/27/2025)                   Radiation Treatments         Plan Last Treated On Elapsed Days Fractions Treated Prescribed Fraction Dose (cGy) Prescribed Total Dose (cGy)    Larynx 1/24/2025 0 @ 01/24/2025 1 of 10 300 3,000                  Reference Point Last Treated On Elapsed Days Most Recent Session Dose (cGy) Total Dose (cGy)    Larynx 1/24/2025 0 @ 01/24/2025 300 300                            First Visit Simple Simulation: Called by [employer] machine to verify treatment parameters including:  treatment site, treatment dose, and treatment setup prior to first treatment. Image derived shifts reviewed in all appropriate planes.  Shifts approved.  Patient treated.    I have personally reviewed the relevant data, performed the target localization, and determined all relevant factors for this patient’s simulation.

## 2025-01-25 NOTE — CARE PLAN
The patient is Watcher - Medium risk of patient condition declining or worsening    Shift Goals  Clinical Goals: monitor labs  Patient Goals: sleep, comfort    Progress made toward(s) clinical / shift goals:        Problem: Pain - Standard  Goal: Alleviation of pain or a reduction in pain to the patient’s comfort goal  Outcome: Progressing     Problem: Knowledge Deficit - Standard  Goal: Patient and family/care givers will demonstrate understanding of plan of care, disease process/condition, diagnostic tests and medications  Outcome: Progressing     Pt and family asked questions regarding potential pain and discomfort with radiation and eating. Pt and family educated regarding potential side effects and plan for possible Gtube and it being something that will have to be assessed as pain, discomfort and nutritional needs arise.     Patient is not progressing towards the following goals:

## 2025-01-25 NOTE — PROGRESS NOTES
Patient cleared for discharge. This RN reviewed paperwork with patient and spouse, and removed pt IV. Follow up appointments scheduled and paperwork given to spouse. Meds delivered to bedside. Patient left with spouse, meds, and all belongings.

## 2025-01-25 NOTE — THERAPY
Speech Language Pathology   Daily Treatment     Patient Name: Pipo Gould  AGE:  64 y.o., SEX:  male  Medical Record #: [id number]  Date of Service: 1/24/2025    Precautions:  Precautions: Fall Risk, Swallow Precautions     Subjective:  Patient seen this date for dysphagia management after MBSS. Patient alert, agreeable to session, and sitting EOB. Patient grateful for SLP follow up.     Assessment:  Provided education regarding pharyngeal dysphagia associated with tumor burden, acute effects of radiation to swallow function, and concern for long-term effects of radiation to swallow function. Discussed eating (RG7/TN0) and exercising through radiation to lead to better long-term swallowing outcomes. Encouraged OP SLP follow-up as needed. Handout provided.Trained the following swallowing exercises:     Deidre Maneuver: Completed with moderate effort and good accuracy. The patient demonstrated adequate engagement with somewhat reduced sustained lingual protrusion.    Effortful Swallow: Completed with moderate effort and good accuracy. Patient exhibited improved initiation and strength with each rep.    Mendelsohn Maneuver: Completed with moderate effort and good accuracy. Patient is able to hold the swallow for 1-2 seconds, with continued emphasis on improving laryngeal elevation.    Supraglottic Swallow: completed x2 and stated understanding.     Trained jaw stretch.     Clinical Impressions:   Patient presents with a mild pharyngeal dysphagia (DIGEST Grade 1; S1, E1). Excellent understanding of SLP recommendations and POC. Recommend use of head and neck cancer dysphagia exercises and continued oral diet through radiation for improved long term swallow outcomes post-radiation.       Recommendations  Diet Consistency: RG7/TN0  Instrumentation: None indicated at this time  Medication: As tolerated, Whole with liquid  Supervision: Distant supervision - check on patient 2-3 times per meal  Positioning: Fully  "upright and midline during oral intake, Remain upright for 45 minutes after oral intake  Risk Management : Small bites/sips, Alternate bites and sips, Slow rate of intake, Physical mobility, as tolerated, Intermittent cough/re-swallow with liquids  Oral Care: Q6h           SLP Treatment Plan  Treatment Plan: Dysphagia Treatment  SLP Frequency: 3x Per Week  Estimated Duration: Until Therapy Goals Met      Anticipated Discharge Needs  Discharge Recommendations: Recommend outpatient speech therapy services  Therapy Recommendations Upon DC: Dysphagia Training      Patient / Family Goals  Patient / Family Goal #1: \"I'm eating fine now\"  Goal #1 Outcome: progressing as expected  Short Term Goals  Short Term Goal # 1: Patient will complete swallow diagnostic to guide dysphagia management.   Goal Outcome # 1: Goal met  Short Term Goal # 2: Pt tolerate RG7/TN0 diet w/o s/sx of airway invasion or pulmonary decline.   Goal Outcome # 2 : Progressing as expected  Short Term Goal # 3: Patient will demonstrate HNC swallow exercises independently.   Goal Outcome  # 3:  Progressing as expected      Daniella Burleson, Student  Supervised by Masha Swartz MS CCC-SLP  "

## 2025-01-25 NOTE — DISCHARGE PLANNING
Medically cleared to discharge home with close outpatient follow up.   Patient will drive to [place] daily for radiation which he verbalized understanding of per previous CM note.   Wife is going to drive patient home today, 1/25/2025, via private transportation.   2nd IMM delivered to and signed by patient yesterday, 1/24/2025, at 1239.   Anticipating no further needs from case management prior to discharge.     Case Management Discharge Planning    Admission Date: 1/23/2025  GMLOS: 2.6  ALOS: 2    6-Clicks ADL Score: 24  6-Clicks Mobility Score: 24      Anticipated Discharge Dispo: Discharge Disposition: Discharged to home/self care (01)    DME Needed: No    Action(s) Taken: Updated Provider/Nurse on Discharge Plan    Escalations Completed: None    Medically Clear: Yes    Next Steps: Anticipating no further needs from case management prior to discharge.     Barriers to Discharge: None    Is the patient up for discharge tomorrow: Today, 1/25/2025, via private transportation from spouse.

## 2025-01-25 NOTE — THERAPY
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 Patient initiated on steroids and recommended palliative radiation (30 Gray, 10 fractions; 1st dose ).    PMHx: tobacco use, thoracotomy (), cervical stenosis of spine, treated by OP ENT for left neck mass (2024).     No PMHx of SLP.     Imagin/23 CT Soft Tissue Neck: LEFT laryngeal mass extending into the adjacent soft tissues with destruction of the thyroid cartilage, measuring 4.1 cm in greatest dimension.  Thickening of the LEFT lateral and posterior pharynx may indicate inflammation or tumor, with narrowing of the aerodigestive tract.  Adjacent inflammatory changes present.      Pertinent Information  Current Method of Nutrition: NPO until cleared by speech pathology  Patient Behaviors:  (Cooperative, Pleasant)  Dentition: Full dentures  Secretion Management: Adequate  Feeding Tube: None  Tracheostomy: No   Factor(s) Affecting Performance: None      Discussed the risks, benefits, and alternatives of the VFSS procedure. Patient/family acknowledged and agreed to proceed.      Assessment  Videofluoroscopic Swallow Study was conducted in the Lateral, A-P projection(s) to evaluate oropharyngeal swallow function. A radiology tech was present to assist with the procedure. DiGEST protocol was utilized (TN0: 5 ml, 10 ml, cup; PU4; RG7).       Positioning: Seated upright in fluoroscopy chair, Standing (AP view)  Anatomic View: bulky laryngeal tissue, consistent with known mass; bony protrusion of C5, suspicious for osteophyte.   Bolus Administration:  Patient  PO Barium Contrast Trials: Varibar thin, Varibar pudding, Regular solid coated with Varibar pudding      Consistency PAS Score Timing Residue Comments   Thin Liquid (TN0) 8 Pre Swallow, During swallow Vallecular Residue: Trace (1%-5%)  Pyriform Sinus Residue: Trace (1%-5%) 5 ml: 1, 8 before  10 ml: 1, 1  Cup: PAS 6, 3  Cup liquid wash: PAS 1   Pudding (PU4) 1 N/A Vallecular Residue: Trace (1%-5%)  Pyriform Sinus Residue: Trace (1%-5%) PAS 1, 1   Regular Solid (RG7) 1 N/A Vallecular Residue: Mild (5%-25%)  Pyriform Sinus Residue: Trace (1%-5%) PAS 1, 1     Penetration-Aspiration Scale (PAS)  1     No contrast enters airway  2     Contrast enters the airway, remains above the vocal folds, and is ejected from the airway (not seen in the airway at the end of the swallow).  3     Contrast enters the airway, remains above the vocal folds, and is not ejected from the airway (is seen in the airway after the swallow).  4     Contrast enters the airway, contacts the vocal folds, and is ejected from the airway.  5     Contrast enters the airway, contacts the vocal folds, and is not ejected from the airway  6     Contrast enters the airway, crosses the plane of the vocal folds, and is ejected from the airway.  7     Contrast enters the airway, crosses the plane of the vocal folds, and is not ejected from the airway despite effort.  8     Contrast enters the airway, crosses the plane of the vocal folds, is not ejected from the airway and there is no response to aspiration.       Oral Phase:   Lip Closure: Intact lip closure with no labial escape.   Bolus control: Impaired with decreased control posteriorly resulting in prespill to valleculae.   Mastication: Intact mastication with prompt/smooth bolus formation.   AP transit: Intact anterior posterior transit with prompt, smooth transit.   Oral residue: complete oral clearance  Pharyngeal: swallow initiated when bolus head at posterior aspect of epiglottis    Pharyngeal  Phase:  Velopharyngeal swallow: Intact; velopharyngeal closure with no bolus between soft palate and pharyngeal wal.  Laryngeal elevation: Impaired; partial superior movement of thyroid cartilage and approximation of epiglottic petiole and arytenoids.  Hyoid excursion: Impaired; partial anterior movement of hyoid.  Epiglottic inversion: Impaired; partial retroflexion.  Laryngeal vestibular closure: Impaired; partial closure with narrow column contrast in laryngeal vestibule.   Pharyngeal stripping wave: present; complete stripping.   Tongue base retraction: Impaired; trace column of contrast noted between the base of tongue and pharyngeal wall.      Deficits resulted in:   - Silent aspiration of TN0 before the swallow r/t loss of oral bolus control.   - Flash aspiration and shallow penetration of TN0 by cup during the swallow. Material remained in the laryngeal vestibule after the swallow.   - Mild (RG7) vallecular residue.     Esophageal Phase:  AP sweep completed with thin liquids and pureed solids in standing position. Slow movement of liquids and solids through esophagus. Increased retention with solids. ?narrowing at mid esophagus.   Partial PES extension and duration with partial obstruction of bolus flow into the esophagus.    Compensatory Strategies:  - Cleansing swallows did not eject penetrate.   - Cued cough ejected penetrate/aspirate.     Results/recommendations discussed with patient, RN, MD. All questions addressed.       Severity Rating:   Severity Rating: DIGEST Safety Grade  DIGEST Safety Grade: Mild         Clinical Impressions  Patient presents with a mild pharyngeal dysphagia (DIGEST 1; S1 E1). Swallow safety and efficiency are both impaired. Primary deficits include reduced hyolaryngeal excursion and incomplete base of tongue retraction. Deficits are best managed with compensatory strategies. Recommend diet initiation of RG7/TN0 with intermittent cough/re-swallow with liquids. Recommend use of  "head and neck cancer dysphagia exercises and continued oral diet through radiation for improved long term swallow outcomes post-radiation.       Recommendations  Diet Consistency: Regular solids, thin liquids  Medication: As tolerated  Supervision: Distant supervision - check on patient 2-3 times per meal  Positioning: Fully upright and midline during oral intake, Remain upright for 45 minutes after oral intake  Strategies: Slow rate of intake, Alternate bites and sips  Oral Care: Q6h  Additional Instrumentation:  (consider repeat MBSS 6 months-1 year post-radiation)         SLP Treatment Plan  Treatment Plan: Dysphagia Treatment  SLP Frequency: 3x Per Week  Estimated Duration: Until Therapy Goals Met      Anticipated Discharge Needs  Discharge Recommendations: Recommend outpatient speech therapy services   Therapy Recommendations Upon DC: Dysphagia Training        Patient / Family Goals  Patient / Family Goal #1: \"I'm eating fine now\"  Goal #1 Outcome: Progressing as expected  Short Term Goal # 1: Patient will complete swallow diagnostic to guide dysphagia management.  Short Term Goal # 2: Pt tolerate RG7/TN0 diet w/o s/sx of airway invasion or pulmonary decline.  Short Term Goal # 3: Patient will demonstrate HNC swallow exercises independently.      Masha Swartz, SLP   "

## 2025-01-25 NOTE — THERAPY
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 Patient initiated on steroids and recommended palliative radiation (30 Gray, 10 fractions; 1st dose )     PMHx: tobacco use, thoracotomy (), cervical stenosis of spine, treated by OP ENT for left neck mass (2024).     No PMHx of SLP.     Imagin/23 CT Soft Tissue Neck: LEFT laryngeal mass extending into the adjacent soft tissues with destruction of the thyroid cartilage, measuring 4.1 cm in greatest dimension.  Thickening of the LEFT lateral and posterior pharynx may indicate inflammation or tumor, with narrowing of the aerodigestive tract.  Adjacent inflammatory changes present.    General Information:  Vitals  O2 (LPM): 0  O2 Delivery Device: None - Room Air     Prior Living Situation & Level of Function:  Housing / Facility: [address] [place] [address]  Lives with - Patient's Self Care Capacity: Spouse  Communication: WFL  Swallowing: WFL     Oral Mechanism Evaluation:  Dentition: Upper denture, Lower denture, Good   Facial Symmetry: Equal  Labial Observations: WFL   Lingual Observations: Midline  Motor Speech: WFL       Laryngeal Function:  Secretion Management: Adequate  Voice Quality: Harsh, Hoarse, StrainG 2 R 1 B 2 A 0 S 1  Grade, Roughness, Breathiness, Asthenia, Strain (GRBAS) subjective auditory-perceptual scale (Hirano, 1981)  (G) A description of the degree of hoarseness, which relates to the overall voice quality, integrating all deviant components  (R) Roughness: the perceptual irregularity of vocal fold vibrations, abnormal fluctuations in F0 or amplitude of  "vibration  (B) Breathiness: an auditive impression of air leakage through the insufficient glottic closure;   (A) Asthenia: the voice denotes weakness and lack of power  (S) Strain: reflects a perception of vocal hyperfunction  The scale evaluates five vocal characteristics assigned on a score of 0-3, where 0 is normal or absence of deviance, 1 is slight deviance, 2 is moderate deviance, and 3 is severe deviance   Cough: Perceptually WNL    Subjective  RN report of pt having no issues today with liquids after initial dose of steroids. Pt reported no PMHx of dysphagia before acute onset of odynophagia 1/23/25. No SLP intervention noted by pt or in medical records. Pt reports eating all foods with no restrictions. When asked how he has been since being admitted, he stated \"the steroids helped and I can swallow again today just fine\".Pt reported hoarseness \"all my life\".    Assessment  Current Method of Nutrition: NPO until cleared by speech pathology  Positioning: Zimmerman's (60-90 degrees)  Bolus Administration: Patient  O2 (LPM): 0 O2 Delivery Device: None - Room Air  Factor(s) Affecting Performance: None  Tracheostomy : No     Swallowing Trials:  Thin Liquid (TN0): WFL  Liquidised (LQ3): WFL  Easy to Chew (EC7): WFL    Comments: Adequate oral bolus acceptance/containment. Presumed timely oral transit. Complete AP transfer without notable oral bolus residue upon oral inspection. Adequate bite with functional mastication of solids. One instance of cough/throat clear appreciated with EC7 after a liquid wash with TN0 which pt stated was from environmental distractors (talking). Vocal quality remained hoarse and strained throughout PO intake which pt stated is his baseline. Single swallow completed per bolus. No signs of esophageal dysfunction. Patient adequately self-feeding with appropriate rate and volume of intake.       Clinical Impressions  Patient presents with clinically functional oropharyngeal swallow, however " "due to laryngeal mass an MBSS is needed to evaluate safety and efficiency of swallow. Additionally, pt wishes to not receive supplemental nutrition and continue a regular diet upon discharge. Pt reported baseline dysphonia since early teen years. Provided education regarding general aspiration precautions as well as signs of aspiration and procedures for MBSS. All questions addressed pt agreed to procedure. RN updated.     Recommendations  Diet Consistency:  RG7/TN0 pending diagnostic   Instrumentation: VFSS (MBSS)  Medication: As tolerated, Whole with liquid  Supervision: Distant supervision - check on patient 2-3 times per meal  Positioning: Fully upright and midline during oral intake  Risk Management : Small bites/sips, Alternate bites and sips, Slow rate of intake, Physical mobility, as tolerated  Oral Care: Q6h     SLP Treatment Plan  Treatment Plan: Dysphagia Treatment  SLP Frequency: 3x Per Week  Estimated Duration: Until Therapy Goals Met    Anticipated Discharge Needs  Discharge Recommendations: Recommend outpatient speech therapy services   Therapy Recommendations Upon DC: Dysphagia Training, Patient / Family / Caregiver Education        Patient / Family Goals  Patient / Family Goal #1: \"I'm eating fine now\"  Short Term Goals  Short Term Goal # 1: Patient will complete swallow diagnostic to guide dysphagia management.       Daniella Burleson, Student   Supervised by Masha Swartz MS CCC-SLP  "

## 2025-01-27 ENCOUNTER — HOSPITAL ENCOUNTER (OUTPATIENT)
Dept: RADIATION ONCOLOGY | Facility: MEDICAL CENTER | Age: 65
End: 2025-01-27

## 2025-01-27 LAB

## 2025-01-27 PROCEDURE — 77387 GUIDANCE FOR RADJ TX DLVR: CPT | Performed by: RADIOLOGY

## 2025-01-27 PROCEDURE — 77014 PR CT GUIDANCE PLACEMENT RAD THERAPY FIELDS: CPT | Mod: 26 | Performed by: RADIOLOGY

## 2025-01-27 PROCEDURE — 77412 RADIATION TX DELIVERY LVL 3: CPT | Performed by: RADIOLOGY

## 2025-01-28 ENCOUNTER — HOSPITAL ENCOUNTER (OUTPATIENT)
Dept: RADIATION ONCOLOGY | Facility: MEDICAL CENTER | Age: 65
End: 2025-01-28

## 2025-01-28 LAB

## 2025-01-28 PROCEDURE — 77014 PR CT GUIDANCE PLACEMENT RAD THERAPY FIELDS: CPT | Mod: 26 | Performed by: RADIOLOGY

## 2025-01-28 PROCEDURE — 77336 RADIATION PHYSICS CONSULT: CPT | Performed by: RADIOLOGY

## 2025-01-28 PROCEDURE — 77412 RADIATION TX DELIVERY LVL 3: CPT | Performed by: RADIOLOGY

## 2025-01-28 PROCEDURE — 77387 GUIDANCE FOR RADJ TX DLVR: CPT | Performed by: RADIOLOGY

## 2025-01-29 ENCOUNTER — HOSPITAL ENCOUNTER (OUTPATIENT)
Dept: RADIATION ONCOLOGY | Facility: MEDICAL CENTER | Age: 65
End: 2025-01-29
Attending: RADIOLOGY
Payer: MEDICARE

## 2025-01-29 ENCOUNTER — HOSPITAL ENCOUNTER (OUTPATIENT)
Dept: RADIATION ONCOLOGY | Facility: MEDICAL CENTER | Age: 65
End: 2025-01-29

## 2025-01-29 VITALS
BODY MASS INDEX: 25.69 KG/M2 | HEART RATE: 67 BPM | RESPIRATION RATE: 18 BRPM | OXYGEN SATURATION: 97 % | TEMPERATURE: 98.1 F | DIASTOLIC BLOOD PRESSURE: 70 MMHG | SYSTOLIC BLOOD PRESSURE: 139 MMHG | WEIGHT: 173.94 LBS

## 2025-01-29 LAB

## 2025-01-29 PROCEDURE — 77387 GUIDANCE FOR RADJ TX DLVR: CPT | Performed by: RADIOLOGY

## 2025-01-29 PROCEDURE — 77412 RADIATION TX DELIVERY LVL 3: CPT | Performed by: RADIOLOGY

## 2025-01-29 PROCEDURE — 77014 PR CT GUIDANCE PLACEMENT RAD THERAPY FIELDS: CPT | Mod: 26 | Performed by: RADIOLOGY

## 2025-01-29 ASSESSMENT — PAIN SCALES - GENERAL: PAINLEVEL_OUTOF10: 1=MINIMAL PAIN

## 2025-01-30 ENCOUNTER — HOSPITAL ENCOUNTER (OUTPATIENT)
Dept: RADIATION ONCOLOGY | Facility: MEDICAL CENTER | Age: 65
End: 2025-01-30

## 2025-01-30 LAB

## 2025-01-30 PROCEDURE — 77387 GUIDANCE FOR RADJ TX DLVR: CPT | Performed by: RADIOLOGY

## 2025-01-30 PROCEDURE — 77014 PR CT GUIDANCE PLACEMENT RAD THERAPY FIELDS: CPT | Mod: 26 | Performed by: RADIOLOGY

## 2025-01-30 PROCEDURE — 77412 RADIATION TX DELIVERY LVL 3: CPT | Performed by: RADIOLOGY

## 2025-01-30 PROCEDURE — 77427 RADIATION TX MANAGEMENT X5: CPT | Performed by: RADIOLOGY

## 2025-01-30 NOTE — ON TREATMENT VISIT
ON TREATMENT  NOTE  RADIATION ONCOLOGY DEPARTMENT    Patient name:  Pipo Gould    Primary Physician:  Pcp Not In Computer MRN: [id number]  CSN: [id number]   Referring physician:  Uli Odonnell M.D.   : 1960, 64 y.o.     ENCOUNTER DATE:  2025      DIAGNOSIS:  Myeloma    TREATMENT SUMMARY:  Course First Treatment Date 2025  Course Last Treatment Date 2025  Radiation Treatments       Active   Plans   Larynx   Most recent treatment: Dose planned: 300 cGy (fraction 4 of 10 on 2025)   Total: Dose planned: 3,000 cGy   Elapsed Days: 5 @ 2025           Historical   No historical radiation treatments to show.                 SUBJECTIVE:  hoarseness    VITAL SIGNS:      2025     3:51 PM 2025     9:17 AM 2025     6:45 AM 2025     5:03 AM 2025     6:54 PM 2025     4:04 PM 2025     7:46 AM   Vitals   SYSTOLIC 139 112  125 144 144 146   DIASTOLIC 70 68  65 78 71 78   Pulse 67 72  71 81 100 79   Temperature 36.7 °C (98.1 °F) 36.3 °C (97.4 °F)  36.3 °C (97.3 °F) 36.4 °C (97.5 °F) 36.5 °C (97.7 °F) 37 °C (98.6 °F)   Respiration 18 16  15 18 18 17   Weight 173.94  167.11       BMI 25.69 kg/m2  24.68 kg/m2       Pulse Oximetry 97 % 93 %  95 % 94 % 93 % 93 %     KPS: 100, Fully active, able to carry on all pre-disease performed without restriction (ECOG equivalent 0)  Encounter Vitals  Temperature: 36.7 °C (98.1 °F)  Blood Pressure : 139/70  Pulse: 67  Respiration: 18  Pulse Oximetry: 97 %  Weight: 78.9 kg (173 lb 15.1 oz)  Weight Source: Stand Up Scale  Pain Score: 1=Minimal Pain      2025     3:51 PM   Pain Assessment   Pain Score MINIMAL PAIN   Pain Loc NECK          PHYSICAL EXAM:  Physical Exam  Vitals reviewed.              2025     3:54 PM   Toxicity Assessment   Toxicity Assessment Head/Neck   Fatigue (lethargy, malaise, asthenia) None   Radiation Dermatitis None   Rash/desquamation None   Constipation None   Dehydration None   Mouth  Dryness Normal   RT Dysphagia-Pharyngeal None   Mucositis None   Salivary Gland Changes None   Stomatitis/Pharyngitis None   Taste Disturbance (dysgeusia) Normal   RT - Pain due to RT None   Cough Absent   Voice changes/stridor/larynx (hoarseness, loss of voice, laryngitis) Mild or intermittent hoarseness       CURRENT MEDICATIONS:    Current Outpatient Medications:     dexamethasone (DECADRON) 4 MG Tab, Take 1 Tablet by mouth 2 times a day for 5 days., Disp: 10 Tablet, Rfl: 0    famotidine (PEPCID) 20 MG Tab, Take 1 Tablet by mouth every day for 30 days., Disp: 30 Tablet, Rfl: 0    sennosides-docusate sodium (SENOKOT-S) 8.6-50 MG tablet, Take 1 Tablet by mouth 2 times a day for 30 days., Disp: 60 Tablet, Rfl: 0    polyethylene glycol/lytes (MIRALAX) Pack, Take 1 Packet by mouth 1 time a day as needed (Constipation)., Disp: 30 Packet, Rfl: 0    oxyCODONE immediate-release (ROXICODONE) 5 MG Tab, Take 5 mg by mouth one time as needed for Severe Pain., Disp: , Rfl:     lisinopril (PRINIVIL) 10 MG Tab, Take 10 mg by mouth every morning., Disp: , Rfl:     acetaminophen (TYLENOL) 500 MG Tab, Take 500 mg by mouth every 6 hours as needed for Mild Pain., Disp: , Rfl:     LABORATORY DATA:   Lab Results   Component Value Date/Time    SODIUM 137 01/25/2025 12:08 AM    POTASSIUM 4.3 01/25/2025 12:08 AM    CHLORIDE 105 01/25/2025 12:08 AM    CO2 20 01/25/2025 12:08 AM    GLUCOSE 137 (H) 01/25/2025 12:08 AM    BUN 37 (H) 01/25/2025 12:08 AM    CREATININE 1.06 01/25/2025 12:08 AM       Lab Results   Component Value Date/Time    WBC 15.1 (H) 01/25/2025 12:08 AM    RBC 3.92 (L) 01/25/2025 12:08 AM    HEMOGLOBIN 12.6 (L) 01/25/2025 12:08 AM    HEMATOCRIT 37.9 (L) 01/25/2025 12:08 AM    MCV 96.7 01/25/2025 12:08 AM    MCH 32.1 01/25/2025 12:08 AM    MCHC 33.2 01/25/2025 12:08 AM    PLATELETCT 167 01/25/2025 12:08 AM         RADIOLOGY DATA:  CT-ABDOMEN W/O    Result Date: 1/24/2025  1.  Pericardial calcification, consider changes  related to prior pericarditis. 2.  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  Thickening of the LEFT lateral and posterior pharynx may indicate inflammation or tumor, with narrowing of the aerodigestive tract.  Adjacent inflammatory changes present.    IR-NEEDLE CORE BX-SOFT TISSUE NECK-CHEST    Result Date: 12/24/2024  Ultrasound-guided LEFT neck mass core biopsy.       IMPRESSION:  Myeloma      PLAN:  No change in treatment plan    Disposition:  Treatment plan and imaging reviewed. Questions answered. Continue therapy outlined.     Martin Walton M.D.    No orders of the defined types were placed in this encounter.

## 2025-01-31 ENCOUNTER — HOSPITAL ENCOUNTER (OUTPATIENT)
Dept: RADIATION ONCOLOGY | Facility: MEDICAL CENTER | Age: 65
End: 2025-01-31
Payer: MEDICARE

## 2025-01-31 LAB

## 2025-01-31 PROCEDURE — 77387 GUIDANCE FOR RADJ TX DLVR: CPT | Performed by: RADIOLOGY

## 2025-01-31 PROCEDURE — 77014 PR CT GUIDANCE PLACEMENT RAD THERAPY FIELDS: CPT | Mod: 26 | Performed by: RADIOLOGY

## 2025-01-31 PROCEDURE — 77412 RADIATION TX DELIVERY LVL 3: CPT | Performed by: RADIOLOGY

## 2025-02-03 ENCOUNTER — HOSPITAL ENCOUNTER (OUTPATIENT)
Dept: RADIATION ONCOLOGY | Facility: MEDICAL CENTER | Age: 65
End: 2025-02-03

## 2025-02-03 ENCOUNTER — HOSPITAL ENCOUNTER (OUTPATIENT)
Dept: RADIATION ONCOLOGY | Facility: MEDICAL CENTER | Age: 65
End: 2025-02-03
Attending: RADIOLOGY
Payer: MEDICARE

## 2025-02-03 LAB

## 2025-02-03 PROCEDURE — 77014 PR CT GUIDANCE PLACEMENT RAD THERAPY FIELDS: CPT | Mod: 26 | Performed by: RADIOLOGY

## 2025-02-03 PROCEDURE — 77412 RADIATION TX DELIVERY LVL 3: CPT | Performed by: RADIOLOGY

## 2025-02-03 PROCEDURE — 77387 GUIDANCE FOR RADJ TX DLVR: CPT | Performed by: RADIOLOGY

## 2025-02-04 ENCOUNTER — HOSPITAL ENCOUNTER (OUTPATIENT)
Dept: RADIATION ONCOLOGY | Facility: MEDICAL CENTER | Age: 65
End: 2025-02-04
Payer: MEDICARE

## 2025-02-04 LAB

## 2025-02-04 PROCEDURE — 77336 RADIATION PHYSICS CONSULT: CPT | Performed by: RADIOLOGY

## 2025-02-04 PROCEDURE — 77412 RADIATION TX DELIVERY LVL 3: CPT | Performed by: RADIOLOGY

## 2025-02-04 PROCEDURE — 77014 PR CT GUIDANCE PLACEMENT RAD THERAPY FIELDS: CPT | Mod: 26 | Performed by: RADIOLOGY

## 2025-02-04 PROCEDURE — 77387 GUIDANCE FOR RADJ TX DLVR: CPT | Performed by: RADIOLOGY

## 2025-02-05 ENCOUNTER — HOSPITAL ENCOUNTER (OUTPATIENT)
Dept: RADIATION ONCOLOGY | Facility: MEDICAL CENTER | Age: 65
End: 2025-02-05
Payer: MEDICARE

## 2025-02-05 ENCOUNTER — PATIENT OUTREACH (OUTPATIENT)
Dept: ONCOLOGY | Facility: MEDICAL CENTER | Age: 65
End: 2025-02-05
Payer: MEDICARE

## 2025-02-05 ENCOUNTER — HOSPITAL ENCOUNTER (OUTPATIENT)
Dept: RADIATION ONCOLOGY | Facility: MEDICAL CENTER | Age: 65
End: 2025-02-05
Attending: RADIOLOGY
Payer: MEDICARE

## 2025-02-05 VITALS
HEART RATE: 82 BPM | BODY MASS INDEX: 25.13 KG/M2 | WEIGHT: 170.19 LBS | RESPIRATION RATE: 16 BRPM | SYSTOLIC BLOOD PRESSURE: 111 MMHG | TEMPERATURE: 98.1 F | DIASTOLIC BLOOD PRESSURE: 68 MMHG | OXYGEN SATURATION: 92 %

## 2025-02-05 DIAGNOSIS — R13.12 OROPHARYNGEAL DYSPHAGIA: ICD-10-CM

## 2025-02-05 LAB
CHEMOTHERAPY INFUSION START DATE: NORMAL
CHEMOTHERAPY RECORDS: 3 GY
CHEMOTHERAPY RECORDS: 3000 CGY
CHEMOTHERAPY RECORDS: NORMAL
CHEMOTHERAPY RX CANCER: NORMAL
DATE 1ST CHEMO CANCER: NORMAL
RAD ONC ARIA COURSE LAST TREATMENT DATE: NORMAL
RAD ONC ARIA COURSE TREATMENT ELAPSED DAYS: NORMAL
RAD ONC ARIA REFERENCE POINT DOSAGE GIVEN TO DATE: 27 GY
RAD ONC ARIA REFERENCE POINT ID: NORMAL
RAD ONC ARIA REFERENCE POINT SESSION DOSAGE GIVEN: 3 GY

## 2025-02-05 PROCEDURE — 77412 RADIATION TX DELIVERY LVL 3: CPT | Performed by: RADIOLOGY

## 2025-02-05 PROCEDURE — 77387 GUIDANCE FOR RADJ TX DLVR: CPT | Performed by: RADIOLOGY

## 2025-02-05 PROCEDURE — 77014 PR CT GUIDANCE PLACEMENT RAD THERAPY FIELDS: CPT | Mod: 26 | Performed by: RADIOLOGY

## 2025-02-05 RX ORDER — LIDOCAINE HYDROCHLORIDE 20 MG/ML
15 SOLUTION OROPHARYNGEAL 3 TIMES DAILY PRN
Qty: 100 ML | Refills: 3 | Status: SHIPPED | OUTPATIENT
Start: 2025-02-05 | End: 2025-03-07

## 2025-02-05 ASSESSMENT — PAIN SCALES - GENERAL: PAINLEVEL_OUTOF10: 7=MODERATE-SEVERE PAIN

## 2025-02-05 NOTE — LETTER
[facility]    [address]  [place] [address]  Phone: [phone] - Fax: [phone]              Pipo Gould  [address] [facility] [address]     Date: 02/05/25    Dear Pipo,    I am a Cancer Nurse Navigator that just said hi to you today in Radiation Oncology. I am glad to have been able to meet you. My role is to assess any needs you may have with education, guidance and added support. I am available to you and your family because you have been in treatment with Dr. Walton.     I am available to address your needs during your journey with the following services:     Care Coordination  I can assist you in facilitating communication between your cancer care treatment team to ensure timely treatment and follow-up.  I can also assist with transition of care back to your primary care provider, or other specialist, as needed.  My goal is to bridge gaps for you throughout the course of your active treatment.       Education Services  Understanding the recommended treatment course by your physician is key. I can provide educational resources personalized to your cancer diagnosis to help you understand your diagnosis and treatment. Please let me know if you would like to receive information about your diagnosis and treatment plan.    I am here to help.     Support Services/Resource Information  [facility] we offer a full scope of support services.    I can assist you with referral information to:  Cancer Clinical Trials & Research  Nutrition counseling  Support groups  Complementary Therapies such as Mind-Body Techniques Meditation  Patient Financial Advocates    [facility], an [employer] affiliate office, our volunteers can assist you with accessing our Curious[employer]coming library, support services information, head coverings and comfort items  Community and national resources, including eligibility based yumiko  assistance and pharmaceutical access programs, as available     On license of [facility] offers services that include:  Behavioral Health  Genetic counseling & testing  Acupuncture  Lymphedema prevention/treatment program  Palliative care services.        I hope you have an excellent patient experience.  Please feel free to share with me your comments regarding the care you have received- we value your feedback.    Sincerely,     Lore Rasheed R.N. :)  Cancer Nurse Navigator    Office: [phone]  Main: [phone]  Email: [email or web address]

## 2025-02-06 ENCOUNTER — HOSPITAL ENCOUNTER (OUTPATIENT)
Dept: RADIATION ONCOLOGY | Facility: MEDICAL CENTER | Age: 65
End: 2025-02-06

## 2025-02-06 LAB

## 2025-02-06 PROCEDURE — 77412 RADIATION TX DELIVERY LVL 3: CPT | Performed by: RADIOLOGY

## 2025-02-06 PROCEDURE — 77387 GUIDANCE FOR RADJ TX DLVR: CPT | Performed by: RADIOLOGY

## 2025-02-06 PROCEDURE — 77427 RADIATION TX MANAGEMENT X5: CPT | Performed by: RADIOLOGY

## 2025-02-06 NOTE — PROGRESS NOTES
ONN able to meet Pipo today after radiation treatment. He endorses doing excellent and will complete this treatment tomorrow. He does have some trouble swallowing but thinks he is eating by mouth enough to maintain nutrition. He was roomed for OTV with Dr. Walton. SARABJIT will reach out again by phone.

## 2025-02-06 NOTE — ON TREATMENT VISIT
ON TREATMENT  NOTE  RADIATION ONCOLOGY DEPARTMENT    Patient name:  Pipo Gould    Primary Physician:  Pcp Not In Computer MRN: [id number]  CSN: [id number]   Referring physician:  Uli Odonnell M.D.   : 1960, 64 y.o.     ENCOUNTER DATE:  2025      DIAGNOSIS:  Myeloma       TREATMENT SUMMARY:  Course First Treatment Date 2025  Course Last Treatment Date 2025  Radiation Treatments       Active   Plans   Larynx   Most recent treatment: Dose planned: 300 cGy (fraction 9 of 10 on 2025)   Total: Dose planned: 3,000 cGy   Elapsed Days: 12 @ 2025           Historical   No historical radiation treatments to show.                 SUBJECTIVE:  dysphagia    VITAL SIGNS:      2025     3:58 PM 2025     3:51 PM 2025     9:17 AM 2025     6:45 AM 2025     5:03 AM 2025     6:54 PM 2025     4:04 PM   Vitals   SYSTOLIC 111 139 112  125 144 144   DIASTOLIC 68 70 68  65 78 71   Pulse 82 67 72  71 81 100   Temperature 36.7 °C (98.1 °F) 36.7 °C (98.1 °F) 36.3 °C (97.4 °F)  36.3 °C (97.3 °F) 36.4 °C (97.5 °F) 36.5 °C (97.7 °F)   Respiration 16 18 16  15 18 18   Weight 170.2 173.94  167.11      BMI 25.13 kg/m2 25.69 kg/m2  24.68 kg/m2      Pulse Oximetry 92 % 97 % 93 %  95 % 94 % 93 %     KPS: 100, Fully active, able to carry on all pre-disease performed without restriction (ECOG equivalent 0)  Encounter Vitals  Temperature: 36.7 °C (98.1 °F)  Blood Pressure : 111/68  Pulse: 82  Respiration: 16  Pulse Oximetry: 92 %  Weight: 77.2 kg (170 lb 3.1 oz)  Weight Source: Stand Up Scale  Pain Score: 7=Moderate-Severe Pain      2025     3:58 PM 2025     3:51 PM   Pain Assessment   Pain Score 7=MODERATE-S MINIMAL PAIN   Pain Loc THROAT NECK          PHYSICAL EXAM:  Physical Exam  Vitals reviewed.              2025     4:01 PM 2025     3:54 PM   Toxicity Assessment   Toxicity Assessment Head/Neck Head/Neck   Fatigue (lethargy, malaise, asthenia) None None    Radiation Dermatitis Faint erythema or dry desquamation None   Rash/desquamation None None   Constipation None None   Dehydration None None   Mouth Dryness Normal Normal   RT Dysphagia-Pharyngeal Mild dysphagia, but can eat regular diet None   Mucositis None None   Salivary Gland Changes Sticky thickened saliva, may have slightly altered taste (e.g. metallic), additional fluids may be required None   Stomatitis/Pharyngitis None None   Taste Disturbance (dysgeusia) Normal Normal   RT - Pain due to RT Moderate pain, pain or analgesics interfering with function, but not interfering with activities of daily living None   Cough Absent Absent   Voice changes/stridor/larynx (hoarseness, loss of voice, laryngitis) Mild or intermittent hoarseness Mild or intermittent hoarseness       CURRENT MEDICATIONS:    Current Outpatient Medications:     famotidine (PEPCID) 20 MG Tab, Take 1 Tablet by mouth every day for 30 days., Disp: 30 Tablet, Rfl: 0    sennosides-docusate sodium (SENOKOT-S) 8.6-50 MG tablet, Take 1 Tablet by mouth 2 times a day for 30 days., Disp: 60 Tablet, Rfl: 0    polyethylene glycol/lytes (MIRALAX) Pack, Take 1 Packet by mouth 1 time a day as needed (Constipation)., Disp: 30 Packet, Rfl: 0    oxyCODONE immediate-release (ROXICODONE) 5 MG Tab, Take 5 mg by mouth one time as needed for Severe Pain., Disp: , Rfl:     lisinopril (PRINIVIL) 10 MG Tab, Take 10 mg by mouth every morning., Disp: , Rfl:     acetaminophen (TYLENOL) 500 MG Tab, Take 500 mg by mouth every 6 hours as needed for Mild Pain., Disp: , Rfl:     LABORATORY DATA:   Lab Results   Component Value Date/Time    SODIUM 137 01/25/2025 12:08 AM    POTASSIUM 4.3 01/25/2025 12:08 AM    CHLORIDE 105 01/25/2025 12:08 AM    CO2 20 01/25/2025 12:08 AM    GLUCOSE 137 (H) 01/25/2025 12:08 AM    BUN 37 (H) 01/25/2025 12:08 AM    CREATININE 1.06 01/25/2025 12:08 AM       Lab Results   Component Value Date/Time    WBC 15.1 (H) 01/25/2025 12:08 AM    RBC 3.92 (L)  01/25/2025 12:08 AM    HEMOGLOBIN 12.6 (L) 01/25/2025 12:08 AM    HEMATOCRIT 37.9 (L) 01/25/2025 12:08 AM    MCV 96.7 01/25/2025 12:08 AM    MCH 32.1 01/25/2025 12:08 AM    MCHC 33.2 01/25/2025 12:08 AM    PLATELETCT 167 01/25/2025 12:08 AM         RADIOLOGY DATA:  CT-ABDOMEN W/O    Result Date: 1/24/2025  1.  Pericardial calcification, consider changes related to prior pericarditis. 2.  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  Thickening of the LEFT lateral and posterior pharynx may indicate inflammation or tumor, with narrowing of the aerodigestive tract.  Adjacent inflammatory changes present.    IR-NEEDLE CORE BX-SOFT TISSUE NECK-CHEST    Result Date: 12/24/2024  Ultrasound-guided LEFT neck mass core biopsy.       IMPRESSION:  Cancer Staging   No matching staging information was found for the patient.      PLAN:  No change in treatment plan    Disposition:  Treatment plan and imaging reviewed. Questions answered. Continue therapy outlined.     Martin Walton M.D.    No orders of the defined types were placed in this encounter.

## 2025-02-07 ENCOUNTER — HOSPITAL ENCOUNTER (OUTPATIENT)
Dept: RADIOLOGY | Facility: MEDICAL CENTER | Age: 65
End: 2025-02-07
Payer: MEDICARE

## 2025-02-07 ENCOUNTER — HOSPITAL ENCOUNTER (OUTPATIENT)
Dept: RADIOLOGY | Facility: MEDICAL CENTER | Age: 65
End: 2025-02-07
Attending: INTERNAL MEDICINE
Payer: MEDICARE

## 2025-02-07 PROCEDURE — 77014 PR CT GUIDANCE PLACEMENT RAD THERAPY FIELDS: CPT | Mod: 26 | Performed by: RADIOLOGY

## 2025-02-07 NOTE — RADIATION COMPLETION NOTES
END OF TREATMENT SUMMARY    Patient name:  Pipo Gould    Primary Physician:  Pcp Not In Computer MRN: [id number]  CSN: [id number]   Referring physician:  No ref. provider found  : 1960, 64 y.o.       TREATMENT SUMMARY:        Course First Treatment Date 2025    Course Last Treatment Date 2025   Course Elapsed Days 13 @ 2025   Course Intent Palliative     Radiation Therapy Episodes       Active Episodes       Radiation Therapy: 3D CRT (2025)                   Radiation Treatments         Plan Last Treated On Elapsed Days Fractions Treated Prescribed Fraction Dose (cGy) Prescribed Total Dose (cGy)    Larynx 2025 13 @ 2025 10 of 10 300 3,000                  Reference Point Last Treated On Elapsed Days Most Recent Session Dose (cGy) Total Dose (cGy)    Larynx 2025 13 @ 2025 300 3,000                                     STAGE:   Myeloma       TREATMENT INDICATION:   palliative     CONCURRENT SYSTEMIC TREATMENT:   sequential     RT COURSE DISCONTINUED EARLY:   No     PATIENT EXPERIENCE:       2025     4:01 PM 2025     3:54 PM   Toxicity Assessment   Toxicity Assessment Head/Neck Head/Neck   Fatigue (lethargy, malaise, asthenia) None None   Radiation Dermatitis Faint erythema or dry desquamation None   Rash/desquamation None None   Constipation None None   Dehydration None None   Mouth Dryness Normal Normal   RT Dysphagia-Pharyngeal Mild dysphagia, but can eat regular diet None   Mucositis None None   Salivary Gland Changes Sticky thickened saliva, may have slightly altered taste (e.g. metallic), additional fluids may be required None   Stomatitis/Pharyngitis None None   Taste Disturbance (dysgeusia) Normal Normal   RT - Pain due to RT Moderate pain, pain or analgesics interfering with function, but not interfering with activities of daily living None   Cough Absent Absent   Voice changes/stridor/larynx (hoarseness, loss of voice, laryngitis) Mild or  intermittent hoarseness Mild or intermittent hoarseness        FOLLOW-UP PLAN:   8 Weeks     COMMENT:          ANATOMIC TARGET SUMMARY    ANATOMIC TARGET MODALITY TECHNIQUE   larynx   External beam, photons IMRT            COMMENT:         DIAGRAMS:      DOSE VOLUME HISTOGRAMS:

## 2025-03-10 ENCOUNTER — HOSPITAL ENCOUNTER (OUTPATIENT)
Dept: RADIATION ONCOLOGY | Facility: MEDICAL CENTER | Age: 65
End: 2025-03-10
Attending: RADIOLOGY
Payer: MEDICARE

## 2025-03-10 NOTE — PROGRESS NOTES
Called patient after completion of palliative radiation 30 Gray in 10 fractions directed to the larynx.  Patient following with Dr. Mario at [facility] and Dr. Odonnell locally.

## 2025-04-17 ENCOUNTER — HOSPITAL ENCOUNTER (OUTPATIENT)
Facility: MEDICAL CENTER | Age: 65
End: 2025-04-17
Attending: NURSE PRACTITIONER
Payer: MEDICARE

## 2025-04-17 LAB
ABO GROUP BLD: NORMAL
BLD GP AB SCN SERPL QL: NORMAL
RH BLD: NORMAL

## 2025-04-17 PROCEDURE — 86850 RBC ANTIBODY SCREEN: CPT

## 2025-04-17 PROCEDURE — 86905 BLOOD TYPING RBC ANTIGENS: CPT | Mod: 91

## 2025-04-17 PROCEDURE — 86900 BLOOD TYPING SEROLOGIC ABO: CPT

## 2025-04-17 PROCEDURE — 86901 BLOOD TYPING SEROLOGIC RH(D): CPT

## 2025-04-21 LAB
C AG RBC QL: NORMAL
E AG RBC QL: NORMAL
KELL GROUP AG RBC: NORMAL
LITTLE C AG RBC QL: NORMAL
LITTLE E AG RBC QL: NORMAL

## 2025-05-07 ENCOUNTER — HOSPITAL ENCOUNTER (OUTPATIENT)
Dept: RADIOLOGY | Facility: MEDICAL CENTER | Age: 65
End: 2025-05-07
Attending: INTERNAL MEDICINE
Payer: MEDICARE

## 2025-05-08 ENCOUNTER — HOSPITAL ENCOUNTER (OUTPATIENT)
Facility: MEDICAL CENTER | Age: 65
End: 2025-05-08
Attending: NURSE PRACTITIONER
Payer: MEDICARE

## 2025-05-08 PROCEDURE — 82784 ASSAY IGA/IGD/IGG/IGM EACH: CPT

## 2025-05-08 PROCEDURE — 86334 IMMUNOFIX E-PHORESIS SERUM: CPT

## 2025-05-08 PROCEDURE — 84165 PROTEIN E-PHORESIS SERUM: CPT

## 2025-05-08 PROCEDURE — 84155 ASSAY OF PROTEIN SERUM: CPT

## 2025-05-08 PROCEDURE — 83521 IG LIGHT CHAINS FREE EACH: CPT | Mod: 91

## 2025-05-12 LAB
ALBUMIN SERPL ELPH-MCNC: 4.39 G/DL (ref 3.75–5.01)
ALPHA1 GLOB SERPL ELPH-MCNC: 0.32 G/DL (ref 0.19–0.46)
ALPHA2 GLOB SERPL ELPH-MCNC: 0.67 G/DL (ref 0.48–1.05)
B-GLOBULIN SERPL ELPH-MCNC: 0.82 G/DL (ref 0.48–1.1)
EER MONOCLONAL PROTEIN AND FLC, SERUM Q5224: ABNORMAL
GAMMA GLOB SERPL ELPH-MCNC: 0.89 G/DL (ref 0.62–1.51)
IGA SERPL-MCNC: 208 MG/DL (ref 68–408)
IGG SERPL-MCNC: 1018 MG/DL (ref 768–1632)
IGM SERPL-MCNC: 59 MG/DL (ref 35–263)
INTERPRETATION SERPL IFE-IMP: ABNORMAL
INTERPRETATION SERPL IFE-IMP: ABNORMAL
KAPPA LC FREE SER-MCNC: 22.3 MG/L (ref 3.3–19.4)
KAPPA LC FREE/LAMBDA FREE SER NEPH: 0.2 {RATIO} (ref 0.26–1.65)
LAMBDA LC FREE SERPL-MCNC: 112.72 MG/L (ref 5.71–26.3)
MONOCLONAL PROTEIN NL11656: ABNORMAL G/DL
PROT SERPL-MCNC: 7.1 G/DL (ref 6.3–8.2)

## 2025-05-29 ENCOUNTER — HOSPITAL ENCOUNTER (OUTPATIENT)
Facility: MEDICAL CENTER | Age: 65
End: 2025-05-29
Attending: NURSE PRACTITIONER
Payer: MEDICARE

## 2025-05-29 PROCEDURE — 84165 PROTEIN E-PHORESIS SERUM: CPT

## 2025-05-29 PROCEDURE — 84155 ASSAY OF PROTEIN SERUM: CPT

## 2025-05-29 PROCEDURE — 82784 ASSAY IGA/IGD/IGG/IGM EACH: CPT

## 2025-05-29 PROCEDURE — 86334 IMMUNOFIX E-PHORESIS SERUM: CPT

## 2025-05-29 PROCEDURE — 83521 IG LIGHT CHAINS FREE EACH: CPT | Mod: 91

## 2025-06-01 LAB
ALBUMIN SERPL ELPH-MCNC: 4.77 G/DL (ref 3.75–5.01)
ALPHA1 GLOB SERPL ELPH-MCNC: 0.34 G/DL (ref 0.19–0.46)
ALPHA2 GLOB SERPL ELPH-MCNC: 0.79 G/DL (ref 0.48–1.05)
B-GLOBULIN SERPL ELPH-MCNC: 0.72 G/DL (ref 0.48–1.1)
EER MONOCLONAL PROTEIN AND FLC, SERUM Q5224: ABNORMAL
GAMMA GLOB SERPL ELPH-MCNC: 0.68 G/DL (ref 0.62–1.51)
IGA SERPL-MCNC: 56 MG/DL (ref 68–408)
IGG SERPL-MCNC: 813 MG/DL (ref 768–1632)
IGM SERPL-MCNC: 40 MG/DL (ref 35–263)
INTERPRETATION SERPL IFE-IMP: ABNORMAL
INTERPRETATION SERPL IFE-IMP: ABNORMAL
KAPPA LC FREE SER-MCNC: 8.75 MG/L (ref 3.3–19.4)
KAPPA LC FREE/LAMBDA FREE SER NEPH: 1.85 {RATIO} (ref 0.26–1.65)
LAMBDA LC FREE SERPL-MCNC: 4.73 MG/L (ref 5.71–26.3)
MONOCLONAL PROTEIN NL11656: ABNORMAL G/DL
PROT SERPL-MCNC: 7.3 G/DL (ref 6.3–8.2)

## 2025-06-26 ENCOUNTER — HOSPITAL ENCOUNTER (OUTPATIENT)
Facility: MEDICAL CENTER | Age: 65
End: 2025-06-26
Attending: NURSE PRACTITIONER
Payer: MEDICARE

## 2025-06-26 PROCEDURE — 84165 PROTEIN E-PHORESIS SERUM: CPT

## 2025-06-26 PROCEDURE — 86334 IMMUNOFIX E-PHORESIS SERUM: CPT

## 2025-06-26 PROCEDURE — 82784 ASSAY IGA/IGD/IGG/IGM EACH: CPT

## 2025-06-26 PROCEDURE — 83521 IG LIGHT CHAINS FREE EACH: CPT

## 2025-06-26 PROCEDURE — 84155 ASSAY OF PROTEIN SERUM: CPT

## 2025-06-28 NOTE — RADIATION COMPLETION NOTES
Clinical Treatment Planning Note    DATE OF SERVICE: 1/24/2025    DIAGNOSIS:  Solitary plasmacytoma      IMAGING REVIEWED:  [x] CT     [] MRI     [] PET/CT     [] BONE SCAN     [] MAMMO     [] OTHER      TREATMENT INTENT:   [] CURATIVE     [] MAINTENANCE     [x]  PALLIATIVE      []  SUPPORTIVE     []  PROPHYLACTIC     [] BENIGN     []  CONSOLIDATIVE      [] DEFINITIVE   []  OLOGIMETASTATIC      LINE OF TREATMENT:  [] ADJUVANT   [x] DEFINITIVE   [] NEOADJUVANT   [] RE-TREATMENT      TECHNIQUE PLANNED:  [] IMRT   [x] 3D   [] SBRT   [] SRS/SRT   [] HDR   [] ELECTRON       IMRT JUSTIFICATION:  []   An immediately adjacent area has been previously irradiated and abutting portals must be established with high precision.    []  Dose escalation is planned to deliver radiation doses in excess of those commonly utilized for similar tumors with conventional treatment.    []  The target volume is concave or convex, and the critical normal tissues are within or around that convexity or concavity.    []  The target volume is in close proximity to critical structures that must be protected.    []  The volume of interest must be covered with narrow margins to adequately protect  immediately adjacent structures.      FIELDS & BLOCKING:  [x] COMPLEX BLOCKS     []  = 3 TX AREAS     [x]  ARCS     []  CUSTOM SHEILD        []  SIMPLE BLOCK      CHEMOTHERAPY:  []  CONCURRENT     []  INDUCTION     [x] SEQUENTIAL     []  <30 DAYS FROM XRT       None

## 2025-06-30 LAB
ALBUMIN SERPL ELPH-MCNC: 4.16 G/DL (ref 3.75–5.01)
ALPHA1 GLOB SERPL ELPH-MCNC: 0.3 G/DL (ref 0.19–0.46)
ALPHA2 GLOB SERPL ELPH-MCNC: 0.7 G/DL (ref 0.48–1.05)
B-GLOBULIN SERPL ELPH-MCNC: 0.62 G/DL (ref 0.48–1.1)
EER MONOCLONAL PROTEIN AND FLC, SERUM Q5224: ABNORMAL
GAMMA GLOB SERPL ELPH-MCNC: 0.52 G/DL (ref 0.62–1.51)
IGA SERPL-MCNC: 22 MG/DL (ref 68–408)
IGG SERPL-MCNC: 561 MG/DL (ref 768–1632)
IGM SERPL-MCNC: 37 MG/DL (ref 35–263)
INTERPRETATION SERPL IFE-IMP: ABNORMAL
INTERPRETATION SERPL IFE-IMP: ABNORMAL
KAPPA LC FREE SER-MCNC: 9.79 MG/L (ref 3.3–19.4)
KAPPA LC FREE/LAMBDA FREE SER NEPH: 2.04 {RATIO} (ref 0.26–1.65)
LAMBDA LC FREE SERPL-MCNC: 4.79 MG/L (ref 5.71–26.3)
MONOCLONAL PROTEIN NL11656: 0.2 G/DL
PROT SERPL-MCNC: 6.3 G/DL (ref 6.3–8.2)

## 2025-07-24 ENCOUNTER — HOSPITAL ENCOUNTER (OUTPATIENT)
Facility: MEDICAL CENTER | Age: 65
End: 2025-07-24
Attending: NURSE PRACTITIONER
Payer: MEDICARE

## 2025-07-24 PROCEDURE — 86334 IMMUNOFIX E-PHORESIS SERUM: CPT

## 2025-07-24 PROCEDURE — 84155 ASSAY OF PROTEIN SERUM: CPT

## 2025-07-24 PROCEDURE — 82784 ASSAY IGA/IGD/IGG/IGM EACH: CPT

## 2025-07-24 PROCEDURE — 84165 PROTEIN E-PHORESIS SERUM: CPT

## 2025-07-24 PROCEDURE — 83521 IG LIGHT CHAINS FREE EACH: CPT | Mod: 91

## 2025-07-28 LAB
ALBUMIN SERPL ELPH-MCNC: 3.67 G/DL (ref 3.75–5.01)
ALPHA1 GLOB SERPL ELPH-MCNC: 0.29 G/DL (ref 0.19–0.46)
ALPHA2 GLOB SERPL ELPH-MCNC: 0.68 G/DL (ref 0.48–1.05)
B-GLOBULIN SERPL ELPH-MCNC: 0.54 G/DL (ref 0.48–1.1)
EER MONOCLONAL PROTEIN AND FLC, SERUM Q5224: ABNORMAL
GAMMA GLOB SERPL ELPH-MCNC: 0.41 G/DL (ref 0.62–1.51)
IGA SERPL-MCNC: 11 MG/DL (ref 68–408)
IGG SERPL-MCNC: 438 MG/DL (ref 768–1632)
IGM SERPL-MCNC: 21 MG/DL (ref 35–263)
INTERPRETATION SERPL IFE-IMP: ABNORMAL
INTERPRETATION SERPL IFE-IMP: ABNORMAL
KAPPA LC FREE SER-MCNC: 8.2 MG/L (ref 3.3–19.4)
KAPPA LC FREE/LAMBDA FREE SER NEPH: 1.85 {RATIO} (ref 0.26–1.65)
LAMBDA LC FREE SERPL-MCNC: 4.44 MG/L (ref 5.71–26.3)
MONOCLONAL PROTEIN NL11656: 0.18 G/DL
PROT SERPL-MCNC: 5.6 G/DL (ref 6.3–8.2)

## 2025-08-21 ENCOUNTER — HOSPITAL ENCOUNTER (OUTPATIENT)
Facility: MEDICAL CENTER | Age: 65
End: 2025-08-21
Attending: NURSE PRACTITIONER
Payer: MEDICARE

## 2025-08-21 LAB — AMBIGUOUS DTTM AMBI4: NORMAL

## 2025-08-21 PROCEDURE — 82784 ASSAY IGA/IGD/IGG/IGM EACH: CPT

## 2025-08-21 PROCEDURE — 83521 IG LIGHT CHAINS FREE EACH: CPT | Mod: 91

## 2025-08-21 PROCEDURE — 84155 ASSAY OF PROTEIN SERUM: CPT

## 2025-08-21 PROCEDURE — 84165 PROTEIN E-PHORESIS SERUM: CPT

## 2025-08-21 PROCEDURE — 86334 IMMUNOFIX E-PHORESIS SERUM: CPT

## 2025-08-25 LAB
ALBUMIN SERPL ELPH-MCNC: 3.94 G/DL (ref 3.75–5.01)
ALPHA1 GLOB SERPL ELPH-MCNC: 0.27 G/DL (ref 0.19–0.46)
ALPHA2 GLOB SERPL ELPH-MCNC: 0.72 G/DL (ref 0.48–1.05)
B-GLOBULIN SERPL ELPH-MCNC: 0.55 G/DL (ref 0.48–1.1)
EER MONOCLONAL PROTEIN AND FLC, SERUM Q5224: ABNORMAL
GAMMA GLOB SERPL ELPH-MCNC: 0.41 G/DL (ref 0.62–1.51)
IGA SERPL-MCNC: 15 MG/DL (ref 68–408)
IGG SERPL-MCNC: 434 MG/DL (ref 768–1632)
IGM SERPL-MCNC: 18 MG/DL (ref 35–263)
INTERPRETATION SERPL IFE-IMP: ABNORMAL
INTERPRETATION SERPL IFE-IMP: ABNORMAL
KAPPA LC FREE SER-MCNC: 7.55 MG/L (ref 3.3–19.4)
KAPPA LC FREE/LAMBDA FREE SER NEPH: 1.89 {RATIO} (ref 0.26–1.65)
LAMBDA LC FREE SERPL-MCNC: 4 MG/L (ref 5.71–26.3)
MONOCLONAL PROTEIN NL11656: 0.22 G/DL
PROT SERPL-MCNC: 5.9 G/DL (ref 6.3–8.2)

## (undated) DEVICE — SET LEADWIRE 5 LEAD BEDSIDE DISPOSABLE ECG (1SET OF 5/EA)

## (undated) DEVICE — ELECTRODE DUAL RETURN W/ CORD - (50/PK)

## (undated) DEVICE — SPONGE GAUZESTER 4 X 4 4PLY - (128PK/CA)

## (undated) DEVICE — CANNULA O2 COMFORT SOFT EAR ADULT 7 FT TUBING (50/CA)

## (undated) DEVICE — PACK MINOR BASIN - (2EA/CA)

## (undated) DEVICE — PACK ENT OR - (2EA/CA)

## (undated) DEVICE — TEETHGUARD ENT -2BX MIN ORDER- (6EA/BX)

## (undated) DEVICE — GOWN WARMING STANDARD FLEX - (30/CA)

## (undated) DEVICE — SENSOR OXIMETER ADULT SPO2 RD SET (20EA/BX)

## (undated) DEVICE — ANTI-FOG SOLUTION - 60BTL/CA

## (undated) DEVICE — MASK OXYGEN VNYL ADLT MED CONC WITH 7 FOOT TUBING - (50EA/CA)

## (undated) DEVICE — DRESSING NON ADHERENT 3 X 4 - STERILE (100/BX 12BX/CA)

## (undated) DEVICE — GLOVE BIOGEL PI INDICATOR SZ 7.5 SURGICAL PF LF -(50/BX 4BX/CA)

## (undated) DEVICE — GLOVE SZ 7.5 BIOGEL PI MICRO - PF LF (50PR/BX)

## (undated) DEVICE — TUBING CLEARLINK DUO-VENT - C-FLO (48EA/CA)

## (undated) DEVICE — LACTATED RINGERS INJ 1000 ML - (14EA/CA 60CA/PF)

## (undated) DEVICE — CANISTER SUCTION 3000ML MECHANICAL FILTER AUTO SHUTOFF MEDI-VAC NONSTERILE LF DISP (40EA/CA)

## (undated) DEVICE — DRESSING TRANSPARENT FILM TEGADERM 2.375 X 2.75" (100EA/BX)"

## (undated) DEVICE — CATHETER SUCTION 14 FR. WITH CONTROL PORT (100/CS)

## (undated) DEVICE — KIT  I.V. START (100EA/CA)

## (undated) DEVICE — CORD ELECTROSURGICAL BIPOLAR FORCEPS VALLEYLAP 12 (50EA/CA)"

## (undated) DEVICE — TUBE E-T HI-LO CUFF 6.0MM (10/PK)

## (undated) DEVICE — SLEEVE VASO DVT COMPRESSION CALF MED - (10PR/CA)

## (undated) DEVICE — TOWELS CLOTH SURGICAL - (4/PK 20PK/CA)

## (undated) DEVICE — SUCTION INSTRUMENT YANKAUER BULBOUS TIP W/O VENT (50EA/CA)

## (undated) DEVICE — TUBE CONNECTING SUCTION - CLEAR PLASTIC STERILE 72 IN (50EA/CA)

## (undated) DEVICE — CANISTER SUCTION RIGID RED 1500CC (40EA/CA)

## (undated) DEVICE — WATER IRRIGATION STERILE 1000ML (12EA/CA)

## (undated) DEVICE — SOD. CHL. INJ. 0.9% 1000 ML - (14EA/CA 60CA/PF)

## (undated) DEVICE — SODIUM CHL IRRIGATION 0.9% 1000ML (12EA/CA)

## (undated) DEVICE — COVER LIGHT HANDLE ALC PLUS DISP (18EA/BX)

## (undated) DEVICE — TOWEL STOP TIMEOUT SAFETY FLAG (40EA/CA)

## (undated) DEVICE — BLADE SURGICAL #15 - (50/BX 3BX/CA)

## (undated) DEVICE — SET EXTENSION WITH 2 PORTS (48EA/CA) ***PART #2C8610 IS A SUBSTITUTE*****

## (undated) DEVICE — SUTURE GENERAL